# Patient Record
Sex: MALE | Race: OTHER | URBAN - METROPOLITAN AREA
[De-identification: names, ages, dates, MRNs, and addresses within clinical notes are randomized per-mention and may not be internally consistent; named-entity substitution may affect disease eponyms.]

---

## 2017-11-10 ENCOUNTER — OUTPATIENT (OUTPATIENT)
Dept: OUTPATIENT SERVICES | Facility: HOSPITAL | Age: 37
LOS: 1 days | Discharge: HOME | End: 2017-11-10

## 2017-11-10 DIAGNOSIS — Z01.818 ENCOUNTER FOR OTHER PREPROCEDURAL EXAMINATION: ICD-10-CM

## 2017-11-10 DIAGNOSIS — M67.432 GANGLION, LEFT WRIST: ICD-10-CM

## 2017-11-16 ENCOUNTER — OUTPATIENT (OUTPATIENT)
Dept: OUTPATIENT SERVICES | Facility: HOSPITAL | Age: 37
LOS: 1 days | Discharge: HOME | End: 2017-11-16

## 2017-11-20 DIAGNOSIS — M67.432 GANGLION, LEFT WRIST: ICD-10-CM

## 2018-11-05 ENCOUNTER — EMERGENCY (EMERGENCY)
Facility: HOSPITAL | Age: 38
LOS: 0 days | Discharge: LEFT BEFORE DISCHARGE | End: 2018-11-05
Attending: EMERGENCY MEDICINE | Admitting: EMERGENCY MEDICINE

## 2018-11-05 VITALS
OXYGEN SATURATION: 99 % | SYSTOLIC BLOOD PRESSURE: 134 MMHG | DIASTOLIC BLOOD PRESSURE: 80 MMHG | WEIGHT: 169.98 LBS | RESPIRATION RATE: 18 BRPM | HEART RATE: 70 BPM | TEMPERATURE: 98 F

## 2018-11-05 DIAGNOSIS — R06.02 SHORTNESS OF BREATH: ICD-10-CM
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
## 2018-11-05 DIAGNOSIS — R07.89 OTHER CHEST PAIN: ICD-10-CM
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
## 2018-11-05 DIAGNOSIS — R19.7 DIARRHEA, UNSPECIFIED: ICD-10-CM

## 2018-11-05 RX ORDER — ACETAMINOPHEN 500 MG
650 TABLET ORAL ONCE
Qty: 0 | Refills: 0 | Status: DISCONTINUED | OUTPATIENT
Start: 2018-11-05 | End: 2018-11-05
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
## 2018-11-05 NOTE — ED PROVIDER NOTE - NSFOLLOWUPINSTRUCTIONS_ED_ALL_ED_FT
Please call your primary care doctor in the AM for a follow up appointment or return to the ER for any continued concerns.        Chest Pain    Chest pain can be caused by many different conditions which may or may not be dangerous. Causes include heartburn, lung infections, heart attack, blood clot in lungs, skin infections, strain or damage to muscle, cartilage, or bones, etc. In addition to a history and physical examination, an electrocardiogram (ECG) or other lab tests may have been performed to determine the cause of your chest pain. Follow up with your primary care provider or with a cardiologist as instructed.     SEEK IMMEDIATE MEDICAL CARE IF YOU HAVE ANY OF THE FOLLOWING SYMPTOMS: worsening chest pain, coughing up blood, unexplained back/neck/jaw pain, severe abdominal pain, dizziness or lightheadedness, fainting, shortness of breath, sweaty or clammy skin, vomiting, or racing heart beat. These symptoms may represent a serious problem that is an emergency. Do not wait to see if the symptoms will go away. Get medical help right away. Call 911 and do not drive yourself to the hospital.

## 2018-11-05 NOTE — ED PROVIDER NOTE - NS ED ROS FT
Review of Systems:  · CONSTITUTIONAL: no fever and no chills.  · EYES: no discharge, no irritation, no pain, no redness, and no visual changes.  · ENMT: Ears: no ear pain and no hearing problems. Nose: no nasal congestion and no nasal drainage. Mouth/Throat: no dysphagia, no hoarseness and no throat pain.  · CARDIOVASCULAR: + chest pain, + chest wall pain  · RESPIRATORY: no cough, and + shortness of breath.  · GASTROINTESTINAL: no abdominal pain, no diarrhea, no nausea and no vomiting.  · GENITOURINARY: no dysuria  · MUSCULOSKELETAL: no back pain, no musculoskeletal pain, no weakness.  · SKIN: no rashes.  · NEURO: no loss of consciousness, no headache.  · ROS STATEMENT: all other ROS negative except as per HPI
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
## 2019-07-01 ENCOUNTER — OUTPATIENT (OUTPATIENT)
Dept: OUTPATIENT SERVICES | Facility: HOSPITAL | Age: 39
LOS: 1 days | End: 2019-07-01
Payer: MEDICAID

## 2019-07-16 ENCOUNTER — EMERGENCY (EMERGENCY)
Facility: HOSPITAL | Age: 39
LOS: 0 days | Discharge: HOME | End: 2019-07-16
Attending: EMERGENCY MEDICINE | Admitting: EMERGENCY MEDICINE
Payer: MEDICAID

## 2019-07-16 VITALS
SYSTOLIC BLOOD PRESSURE: 130 MMHG | HEART RATE: 86 BPM | OXYGEN SATURATION: 100 % | DIASTOLIC BLOOD PRESSURE: 81 MMHG | RESPIRATION RATE: 20 BRPM

## 2019-07-16 VITALS
RESPIRATION RATE: 20 BRPM | OXYGEN SATURATION: 98 % | DIASTOLIC BLOOD PRESSURE: 90 MMHG | SYSTOLIC BLOOD PRESSURE: 136 MMHG | HEART RATE: 88 BPM | TEMPERATURE: 98 F
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
## 2019-07-16 DIAGNOSIS — S32.009A UNSPECIFIED FRACTURE OF UNSPECIFIED LUMBAR VERTEBRA, INITIAL ENCOUNTER FOR CLOSED FRACTURE: ICD-10-CM
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
## 2019-07-16 DIAGNOSIS — M54.5 LOW BACK PAIN: ICD-10-CM

## 2019-07-16 LAB
ALBUMIN SERPL ELPH-MCNC: 4.5 G/DL — SIGNIFICANT CHANGE UP (ref 3.5–5.2)
ALP SERPL-CCNC: 56 U/L — SIGNIFICANT CHANGE UP (ref 30–115)
ALT FLD-CCNC: 36 U/L — SIGNIFICANT CHANGE UP (ref 0–41)
ANION GAP SERPL CALC-SCNC: 12 MMOL/L — SIGNIFICANT CHANGE UP (ref 7–14)
AST SERPL-CCNC: 18 U/L — SIGNIFICANT CHANGE UP (ref 0–41)
BASOPHILS # BLD AUTO: 0.02 K/UL — SIGNIFICANT CHANGE UP (ref 0–0.2)
BASOPHILS NFR BLD AUTO: 0.4 % — SIGNIFICANT CHANGE UP (ref 0–1)
BILIRUB SERPL-MCNC: 0.5 MG/DL — SIGNIFICANT CHANGE UP (ref 0.2–1.2)
BUN SERPL-MCNC: 16 MG/DL — SIGNIFICANT CHANGE UP (ref 10–20)
CALCIUM SERPL-MCNC: 9.6 MG/DL — SIGNIFICANT CHANGE UP (ref 8.5–10.1)
CHLORIDE SERPL-SCNC: 102 MMOL/L — SIGNIFICANT CHANGE UP (ref 98–110)
CO2 SERPL-SCNC: 25 MMOL/L — SIGNIFICANT CHANGE UP (ref 17–32)
CREAT SERPL-MCNC: 0.9 MG/DL — SIGNIFICANT CHANGE UP (ref 0.7–1.5)
EOSINOPHIL # BLD AUTO: 0.03 K/UL — SIGNIFICANT CHANGE UP (ref 0–0.7)
EOSINOPHIL NFR BLD AUTO: 0.6 % — SIGNIFICANT CHANGE UP (ref 0–8)
GLUCOSE SERPL-MCNC: 180 MG/DL — HIGH (ref 70–99)
HCT VFR BLD CALC: 40.8 % — LOW (ref 42–52)
HGB BLD-MCNC: 13.9 G/DL — LOW (ref 14–18)
IMM GRANULOCYTES NFR BLD AUTO: 0.2 % — SIGNIFICANT CHANGE UP (ref 0.1–0.3)
LIDOCAIN IGE QN: 21 U/L — SIGNIFICANT CHANGE UP (ref 7–60)
LYMPHOCYTES # BLD AUTO: 1.52 K/UL — SIGNIFICANT CHANGE UP (ref 1.2–3.4)
LYMPHOCYTES # BLD AUTO: 31.3 % — SIGNIFICANT CHANGE UP (ref 20.5–51.1)
MCHC RBC-ENTMCNC: 29.4 PG — SIGNIFICANT CHANGE UP (ref 27–31)
MCHC RBC-ENTMCNC: 34.1 G/DL — SIGNIFICANT CHANGE UP (ref 32–37)
MCV RBC AUTO: 86.4 FL — SIGNIFICANT CHANGE UP (ref 80–94)
MONOCYTES # BLD AUTO: 0.39 K/UL — SIGNIFICANT CHANGE UP (ref 0.1–0.6)
MONOCYTES NFR BLD AUTO: 8 % — SIGNIFICANT CHANGE UP (ref 1.7–9.3)
NEUTROPHILS # BLD AUTO: 2.88 K/UL — SIGNIFICANT CHANGE UP (ref 1.4–6.5)
NEUTROPHILS NFR BLD AUTO: 59.5 % — SIGNIFICANT CHANGE UP (ref 42.2–75.2)
NRBC # BLD: 0 /100 WBCS — SIGNIFICANT CHANGE UP (ref 0–0)
PLATELET # BLD AUTO: 254 K/UL — SIGNIFICANT CHANGE UP (ref 130–400)
POTASSIUM SERPL-MCNC: 4.5 MMOL/L — SIGNIFICANT CHANGE UP (ref 3.5–5)
POTASSIUM SERPL-SCNC: 4.5 MMOL/L — SIGNIFICANT CHANGE UP (ref 3.5–5)
PROT SERPL-MCNC: 7.3 G/DL — SIGNIFICANT CHANGE UP (ref 6–8)
RBC # BLD: 4.72 M/UL — SIGNIFICANT CHANGE UP (ref 4.7–6.1)
RBC # FLD: 12.4 % — SIGNIFICANT CHANGE UP (ref 11.5–14.5)
SODIUM SERPL-SCNC: 139 MMOL/L — SIGNIFICANT CHANGE UP (ref 135–146)
WBC # BLD: 4.85 K/UL — SIGNIFICANT CHANGE UP (ref 4.8–10.8)
WBC # FLD AUTO: 4.85 K/UL — SIGNIFICANT CHANGE UP (ref 4.8–10.8)

## 2019-07-16 PROCEDURE — 74177 CT ABD & PELVIS W/CONTRAST: CPT | Mod: 26

## 2019-07-16 PROCEDURE — 93010 ELECTROCARDIOGRAM REPORT: CPT

## 2019-07-16 PROCEDURE — 99285 EMERGENCY DEPT VISIT HI MDM: CPT

## 2019-07-16 PROCEDURE — 72170 X-RAY EXAM OF PELVIS: CPT | Mod: 26

## 2019-07-16 PROCEDURE — 71260 CT THORAX DX C+: CPT | Mod: 26

## 2019-07-16 PROCEDURE — 71045 X-RAY EXAM CHEST 1 VIEW: CPT | Mod: 26

## 2019-07-16 RX ORDER — DIPHENHYDRAMINE HCL 50 MG
50 CAPSULE ORAL ONCE
Refills: 0 | Status: COMPLETED | OUTPATIENT
Start: 2019-07-16 | End: 2019-07-16

## 2019-07-16 RX ORDER — MORPHINE SULFATE 50 MG/1
4 CAPSULE, EXTENDED RELEASE ORAL ONCE
Refills: 0 | Status: DISCONTINUED | OUTPATIENT
Start: 2019-07-16 | End: 2019-07-16

## 2019-07-16 RX ORDER — IPRATROPIUM/ALBUTEROL SULFATE 18-103MCG
3 AEROSOL WITH ADAPTER (GRAM) INHALATION ONCE
Refills: 0 | Status: COMPLETED | OUTPATIENT
Start: 2019-07-16 | End: 2019-07-16

## 2019-07-16 RX ORDER — DIPHENHYDRAMINE HCL 50 MG
50 CAPSULE ORAL EVERY 6 HOURS
Refills: 0 | Status: DISCONTINUED | OUTPATIENT
Start: 2019-07-16 | End: 2019-07-16

## 2019-07-16 RX ORDER — SODIUM CHLORIDE 9 MG/ML
1000 INJECTION, SOLUTION INTRAVENOUS ONCE
Refills: 0 | Status: COMPLETED | OUTPATIENT
Start: 2019-07-16 | End: 2019-07-16

## 2019-07-16 RX ADMIN — Medication 50 MILLIGRAM(S): at 10:47

## 2019-07-16 RX ADMIN — MORPHINE SULFATE 4 MILLIGRAM(S): 50 CAPSULE, EXTENDED RELEASE ORAL at 10:12

## 2019-07-16 RX ADMIN — SODIUM CHLORIDE 2000 MILLILITER(S): 9 INJECTION, SOLUTION INTRAVENOUS at 10:14

## 2019-07-16 RX ADMIN — Medication 125 MILLIGRAM(S): at 10:47

## 2019-07-16 RX ADMIN — Medication 3 MILLILITER(S): at 11:58

## 2019-07-16 NOTE — ED PROVIDER NOTE - NSFOLLOWUPINSTRUCTIONS_ED_ALL_ED_FT
Transverse process Fracture    A fracture is a break in one of your bones. This can occur from a variety of injuries, especially traumatic ones. Symptoms include pain, bruising, or swelling. Do not use the injured limb. If a fracture is in one of the bones below your waist, do not put weight on that limb unless instructed to do so by your healthcare provider. Crutches or a cane may have been provided. A splint or cast may have been applied by your health care provider. Make sure to keep it dry and follow up with an orthopedist as instructed.    SEEK IMMEDIATE MEDICAL CARE IF YOU HAVE ANY OF THE FOLLOWING SYMPTOMS: numbness, tingling, increasing pain, or weakness in any part of the injured limb.

## 2019-07-16 NOTE — ED PROVIDER NOTE - NS ED ROS FT
Review of Systems:  •	CONSTITUTIONAL - No fever, No diaphoresis, No weight change  •	SKIN - No rash  •	HEMATOLOGIC - No abnormal bleeding or bruising  •	EYES - No eye pain, No blurred vision  •	ENT - No change in hearing, No sore throat, No neck pain, No rhinorrhea, No ear pain  •	RESPIRATORY - No shortness of breath, No cough  •	CARDIAC -No chest pain, No palpitations  •	GI - No abdominal pain, No nausea, No vomiting, No diarrhea, No constipation, No bright red blood per rectum or melena. No flank pain  •                 - No dysuria, frequency, hematuria.   •	ENDO - No polydypsia, No polyuria, No heat/cold intolerance  •	MUSCULOSKELETAL - No joint paint, No swelling, + back pain  •	NEUROLOGIC - No numbness, No focal weakness, No headache, No dizziness  All other systems negative, unless specified in HPI

## 2019-07-16 NOTE — ED PROVIDER NOTE - PHYSICAL EXAMINATION
CONSTITUTIONAL: Well-developed; well-nourished; in no acute distress.   SKIN: warm, dry  HEAD: Normocephalic; atraumatic.  EYES: no conjunctival injection. PERRL.   ENT: No nasal discharge; airway clear.  NECK: Supple; non tender.  CARD: S1, S2 normal; no murmurs, gallops, or rubs. Regular rate and rhythm.   RESP: No wheezes, rales or rhonchi.  ABD: soft ntnd  BACK: Mild ttp over the midline thoracolumbar area.  EXT: Normal ROM.  No clubbing, cyanosis or edema.   LYMPH: No acute cervical adenopathy.  NEURO: AOx4, CN 2-12 grossly intact. +5/5 strength and sensation wnl in all extremities. No pronator drift, no dysarthria, no ataxia, normal gait, no DM/DDK, negative romberg.  PSYCH: Cooperative, appropriate.
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
## 2019-07-16 NOTE — ED ADULT NURSE REASSESSMENT NOTE - NS ED NURSE REASSESS COMMENT FT1
pt returned from CT scan, pt family at bedside, pt states he feels itchy on face "I want to sratch my face off" pt was found to have forehead red with a 1 hive to left forehead,  MD Khanna and MD Campos immedatiely notified and assessed pt.

## 2019-07-16 NOTE — ED PROVIDER NOTE - CARE PROVIDER_API CALL
Chantell Garcia)  Neurological Surgery  [address], [address]  [place] [address]  Phone: [phone]  Fax: [phone]  Follow Up Time:

## 2019-07-16 NOTE — ED ADULT NURSE NOTE - NSIMPLEMENTINTERV_GEN_ALL_ED
Implemented All Fall with Harm Risk Interventions:  Dorris to call system. Call bell, personal items and telephone within reach. Instruct patient to call for assistance. Room bathroom lighting operational. Non-slip footwear when patient is off stretcher. Physically safe environment: no spills, clutter or unnecessary equipment. Stretcher in lowest position, wheels locked, appropriate side rails in place. Provide visual cue, wrist band, yellow gown, etc. Monitor gait and stability. Monitor for mental status changes and reorient to person, place, and time. Review medications for side effects contributing to fall risk. Reinforce activity limits and safety measures with patient and family. Provide visual clues: red socks.

## 2019-07-18 PROBLEM — Z00.00 ENCOUNTER FOR PREVENTIVE HEALTH EXAMINATION: Status: ACTIVE | Noted: 2019-07-18

## 2019-07-23 DIAGNOSIS — Z71.89 OTHER SPECIFIED COUNSELING: ICD-10-CM

## 2019-07-24 ENCOUNTER — FORM ENCOUNTER (OUTPATIENT)
Age: 39
End: 2019-07-24

## 2019-07-25 ENCOUNTER — APPOINTMENT (OUTPATIENT)
Dept: PEDIATRIC ORTHOPEDIC SURGERY | Facility: CLINIC | Age: 39
End: 2019-07-25
Payer: MEDICAID

## 2019-07-25 ENCOUNTER — TRANSCRIPTION ENCOUNTER (OUTPATIENT)
Age: 39
End: 2019-07-25

## 2019-07-25 ENCOUNTER — OUTPATIENT (OUTPATIENT)
Dept: OUTPATIENT SERVICES | Facility: HOSPITAL | Age: 39
LOS: 1 days | Discharge: HOME | End: 2019-07-25
Payer: MEDICAID

## 2019-07-25 DIAGNOSIS — S32.009A UNSPECIFIED FRACTURE OF UNSPECIFIED LUMBAR VERTEBRA, INITIAL ENCOUNTER FOR CLOSED FRACTURE: ICD-10-CM

## 2019-07-25 DIAGNOSIS — S82.192A OTHER FRACTURE OF UPPER END OF LEFT TIBIA, INITIAL ENCOUNTER FOR CLOSED FRACTURE: ICD-10-CM

## 2019-07-25 DIAGNOSIS — M54.5 LOW BACK PAIN: ICD-10-CM

## 2019-07-25 DIAGNOSIS — Z78.9 OTHER SPECIFIED HEALTH STATUS: ICD-10-CM
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
## 2019-07-25 DIAGNOSIS — S83.511A SPRAIN OF ANTERIOR CRUCIATE LIGAMENT OF RIGHT KNEE, INITIAL ENCOUNTER: ICD-10-CM

## 2019-07-25 PROCEDURE — 99204 OFFICE O/P NEW MOD 45 MIN: CPT

## 2019-07-25 PROCEDURE — 73562 X-RAY EXAM OF KNEE 3: CPT | Mod: 26,LT

## 2019-07-25 RX ORDER — GABAPENTIN 300 MG/1
300 CAPSULE ORAL 3 TIMES DAILY
Qty: 28 | Refills: 0 | Status: ACTIVE | COMMUNITY
Start: 2019-07-25 | End: 1900-01-01

## 2019-07-25 RX ORDER — AZITHROMYCIN 250 MG/1
250 TABLET, FILM COATED ORAL
Qty: 6 | Refills: 0 | Status: DISCONTINUED | COMMUNITY
Start: 2019-04-16
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
## 2019-07-25 NOTE — ASSESSMENT
[FreeTextEntry1] : Will repeat knee xrays\par Will get corset and then LSO for the spine\par Neurontin and Advil for pain control\par \par PT OT for left knee ROM after 4 weeks\par \par MRI left knee in 3 weeks

## 2019-07-25 NOTE — PHYSICAL EXAM
[UE/LE] : Sensory: Intact in bilateral upper & lower extremities [ALL] : dorsalis pedis, posterior tibial, femoral, popliteal, and radial 2+ and symmetric bilaterally [Normal RUE] : Right Upper Extremity: No scars, rashes, lesions, ulcers, skin intact [Normal LUE] : Left Upper Extremity: No scars, rashes, lesions, ulcers, skin intact [Normal RLE] : Right Lower Extremity: No scars, rashes, lesions, ulcers, skin intact [Normal LLE] : Left Lower Extremity: No scars, rashes, lesions, ulcers, skin intact [Normal] : Normal bowel sounds, soft, non-tender, no hepato-splenomegaly and no abdominal mass palpated [Poor Appearance] : well-appearing [Acute Distress] : not in acute distress [de-identified] : TTP at right aspect of his spine\par \par LEft knee pain w anterior drawer and lachman\par Good ROM \par  [de-identified] : Right L3/L4 TP fx\par ? lucency in tibial eminence\par

## 2019-08-05 DIAGNOSIS — Z76.89 PERSONS ENCOUNTERING HEALTH SERVICES IN OTHER SPECIFIED CIRCUMSTANCES: ICD-10-CM

## 2020-01-23 NOTE — ED PROVIDER NOTE - NSFOLLOWUPCLINICS_GEN_ALL_ED_FT
A Family Medicine Doctor  Family Medicine  .  [place]   Phone:   Fax:   Follow Up Time: Electrodesiccation And Curettage Text: The wound bed was treated with electrodesiccation and curettage after the biopsy was performed. Render Post-Care Instructions In Note?: yes Dressing: bandage Consent: Written consent was obtained and risks were reviewed including but not limited to scarring, infection, bleeding, scabbing, incomplete removal, nerve damage and allergy to anesthesia. Hide Additional Anticipated Plan?: No Type Of Destruction Used: Curettage Anesthesia Volume In Cc: 0.5 Detail Level: Detailed Billing Type: Third-Party Bill Silver Nitrate Text: The wound bed was treated with silver nitrate after the biopsy was performed. Hemostasis: Electrocautery and Aluminum Chloride Biopsy Method: Dermablade Post-Care Instructions: I reviewed with the patient in detail post-care instructions. Patient is to keep the biopsy site dry overnight, and then apply petrolatum daily or as needed until healed. Size Of Lesion In Cm: 1 Cryotherapy Text: The wound bed was treated with cryotherapy after the biopsy was performed. Additional Anesthesia Volume In Cc (Will Not Render If 0): 0 Anesthesia Type: 1% lidocaine with epinephrine Curettage Text: The wound bed was treated with curettage after the biopsy was performed. Lab: [id number] Depth Of Biopsy: dermis Biopsy Type: H and E Electrodesiccation Text: The wound bed was treated with electrodesiccation after the biopsy was performed. Lab Facility:  Wound Care: Petrolatum Notification Instructions: Patient will be notified of biopsy results. However, patient instructed to call the office if not contacted within 2 weeks.

## 2020-01-28 ENCOUNTER — OUTPATIENT (OUTPATIENT)
Dept: OUTPATIENT SERVICES | Facility: HOSPITAL | Age: 40
LOS: 1 days | Discharge: HOME | End: 2020-01-28
Payer: MEDICAID

## 2020-01-28 VITALS
TEMPERATURE: 98 F | SYSTOLIC BLOOD PRESSURE: 135 MMHG | WEIGHT: 175.93 LBS | DIASTOLIC BLOOD PRESSURE: 84 MMHG | HEIGHT: 64 IN | HEART RATE: 98 BPM | OXYGEN SATURATION: 100 % | RESPIRATION RATE: 22 BRPM

## 2020-01-28 DIAGNOSIS — Z87.81 PERSONAL HISTORY OF (HEALED) TRAUMATIC FRACTURE: Chronic | ICD-10-CM

## 2020-01-28 DIAGNOSIS — Z98.890 OTHER SPECIFIED POSTPROCEDURAL STATES: Chronic | ICD-10-CM

## 2020-01-28 DIAGNOSIS — Z01.818 ENCOUNTER FOR OTHER PREPROCEDURAL EXAMINATION: ICD-10-CM

## 2020-01-28 DIAGNOSIS — S43.432A SUPERIOR GLENOID LABRUM LESION OF LEFT SHOULDER, INITIAL ENCOUNTER: ICD-10-CM

## 2020-01-28 LAB
ALBUMIN SERPL ELPH-MCNC: 4.7 G/DL — SIGNIFICANT CHANGE UP (ref 3.5–5.2)
ALP SERPL-CCNC: 64 U/L — SIGNIFICANT CHANGE UP (ref 30–115)
ALT FLD-CCNC: 41 U/L — SIGNIFICANT CHANGE UP (ref 0–41)
ANION GAP SERPL CALC-SCNC: 16 MMOL/L — HIGH (ref 7–14)
APTT BLD: 30.4 SEC — SIGNIFICANT CHANGE UP (ref 27–39.2)
AST SERPL-CCNC: 23 U/L — SIGNIFICANT CHANGE UP (ref 0–41)
BASOPHILS # BLD AUTO: 0.03 K/UL — SIGNIFICANT CHANGE UP (ref 0–0.2)
BASOPHILS NFR BLD AUTO: 0.5 % — SIGNIFICANT CHANGE UP (ref 0–1)
BILIRUB SERPL-MCNC: 0.4 MG/DL — SIGNIFICANT CHANGE UP (ref 0.2–1.2)
BUN SERPL-MCNC: 13 MG/DL — SIGNIFICANT CHANGE UP (ref 10–20)
CALCIUM SERPL-MCNC: 9.4 MG/DL — SIGNIFICANT CHANGE UP (ref 8.5–10.1)
CHLORIDE SERPL-SCNC: 97 MMOL/L — LOW (ref 98–110)
CO2 SERPL-SCNC: 23 MMOL/L — SIGNIFICANT CHANGE UP (ref 17–32)
CREAT SERPL-MCNC: 0.8 MG/DL — SIGNIFICANT CHANGE UP (ref 0.7–1.5)
EOSINOPHIL # BLD AUTO: 0.05 K/UL — SIGNIFICANT CHANGE UP (ref 0–0.7)
EOSINOPHIL NFR BLD AUTO: 0.8 % — SIGNIFICANT CHANGE UP (ref 0–8)
GLUCOSE SERPL-MCNC: 237 MG/DL — HIGH (ref 70–99)
HCT VFR BLD CALC: 43.2 % — SIGNIFICANT CHANGE UP (ref 42–52)
HGB BLD-MCNC: 15.2 G/DL — SIGNIFICANT CHANGE UP (ref 14–18)
IMM GRANULOCYTES NFR BLD AUTO: 0.2 % — SIGNIFICANT CHANGE UP (ref 0.1–0.3)
INR BLD: 0.87 RATIO — SIGNIFICANT CHANGE UP (ref 0.65–1.3)
LYMPHOCYTES # BLD AUTO: 2.17 K/UL — SIGNIFICANT CHANGE UP (ref 1.2–3.4)
LYMPHOCYTES # BLD AUTO: 34.4 % — SIGNIFICANT CHANGE UP (ref 20.5–51.1)
MCHC RBC-ENTMCNC: 30.6 PG — SIGNIFICANT CHANGE UP (ref 27–31)
MCHC RBC-ENTMCNC: 35.2 G/DL — SIGNIFICANT CHANGE UP (ref 32–37)
MCV RBC AUTO: 87.1 FL — SIGNIFICANT CHANGE UP (ref 80–94)
MONOCYTES # BLD AUTO: 0.51 K/UL — SIGNIFICANT CHANGE UP (ref 0.1–0.6)
MONOCYTES NFR BLD AUTO: 8.1 % — SIGNIFICANT CHANGE UP (ref 1.7–9.3)
NEUTROPHILS # BLD AUTO: 3.54 K/UL — SIGNIFICANT CHANGE UP (ref 1.4–6.5)
NEUTROPHILS NFR BLD AUTO: 56 % — SIGNIFICANT CHANGE UP (ref 42.2–75.2)
NRBC # BLD: 0 /100 WBCS — SIGNIFICANT CHANGE UP (ref 0–0)
PLATELET # BLD AUTO: 258 K/UL — SIGNIFICANT CHANGE UP (ref 130–400)
POTASSIUM SERPL-MCNC: 4.4 MMOL/L — SIGNIFICANT CHANGE UP (ref 3.5–5)
POTASSIUM SERPL-SCNC: 4.4 MMOL/L — SIGNIFICANT CHANGE UP (ref 3.5–5)
PROT SERPL-MCNC: 7.4 G/DL — SIGNIFICANT CHANGE UP (ref 6–8)
PROTHROM AB SERPL-ACNC: 10 SEC — SIGNIFICANT CHANGE UP (ref 9.95–12.87)
RBC # BLD: 4.96 M/UL — SIGNIFICANT CHANGE UP (ref 4.7–6.1)
RBC # FLD: 12.1 % — SIGNIFICANT CHANGE UP (ref 11.5–14.5)
SODIUM SERPL-SCNC: 136 MMOL/L — SIGNIFICANT CHANGE UP (ref 135–146)
WBC # BLD: 6.31 K/UL — SIGNIFICANT CHANGE UP (ref 4.8–10.8)
WBC # FLD AUTO: 6.31 K/UL — SIGNIFICANT CHANGE UP (ref 4.8–10.8)

## 2020-01-28 PROCEDURE — 93010 ELECTROCARDIOGRAM REPORT: CPT

## 2020-01-28 NOTE — H&P PST ADULT - HISTORY OF PRESENT ILLNESS
s40 y/o male scheduled for right shoulder arthroscopy labral repair decompression debridement  reports no c/o cp,sob,palpitations,cough or dysuria  1-2 fos without sob

## 2020-01-29 LAB
ESTIMATED AVERAGE GLUCOSE: 200 MG/DL — HIGH (ref 68–114)
HBA1C BLD-MCNC: 8.6 % — HIGH (ref 4–5.6)

## 2020-02-04 ENCOUNTER — OUTPATIENT (OUTPATIENT)
Dept: OUTPATIENT SERVICES | Facility: HOSPITAL | Age: 40
LOS: 1 days | Discharge: HOME | End: 2020-02-04
Payer: MEDICAID

## 2020-02-04 ENCOUNTER — RESULT REVIEW (OUTPATIENT)
Age: 40
End: 2020-02-04

## 2020-02-04 VITALS
OXYGEN SATURATION: 97 % | RESPIRATION RATE: 20 BRPM | HEART RATE: 90 BPM | SYSTOLIC BLOOD PRESSURE: 131 MMHG | DIASTOLIC BLOOD PRESSURE: 89 MMHG

## 2020-02-04 VITALS
RESPIRATION RATE: 18 BRPM | WEIGHT: 175.93 LBS | HEIGHT: 64 IN | DIASTOLIC BLOOD PRESSURE: 76 MMHG | HEART RATE: 75 BPM | SYSTOLIC BLOOD PRESSURE: 117 MMHG | TEMPERATURE: 98 F | OXYGEN SATURATION: 98 %

## 2020-02-04 DIAGNOSIS — Z87.81 PERSONAL HISTORY OF (HEALED) TRAUMATIC FRACTURE: Chronic | ICD-10-CM

## 2020-02-04 DIAGNOSIS — Z98.890 OTHER SPECIFIED POSTPROCEDURAL STATES: Chronic | ICD-10-CM

## 2020-02-04 DIAGNOSIS — S43.439A SUPERIOR GLENOID LABRUM LESION OF UNSPECIFIED SHOULDER, INITIAL ENCOUNTER: ICD-10-CM

## 2020-02-04 LAB — GLUCOSE BLDC GLUCOMTR-MCNC: 163 MG/DL — HIGH (ref 70–99)

## 2020-02-04 PROCEDURE — 88304 TISSUE EXAM BY PATHOLOGIST: CPT | Mod: 26

## 2020-02-04 RX ORDER — OXYCODONE AND ACETAMINOPHEN 5; 325 MG/1; MG/1
1 TABLET ORAL EVERY 4 HOURS
Refills: 0 | Status: DISCONTINUED | OUTPATIENT
Start: 2020-02-04 | End: 2020-02-04

## 2020-02-04 RX ORDER — HYDROMORPHONE HYDROCHLORIDE 2 MG/ML
1 INJECTION INTRAMUSCULAR; INTRAVENOUS; SUBCUTANEOUS
Refills: 0 | Status: DISCONTINUED | OUTPATIENT
Start: 2020-02-04 | End: 2020-02-04

## 2020-02-04 RX ORDER — ONDANSETRON 8 MG/1
4 TABLET, FILM COATED ORAL ONCE
Refills: 0 | Status: DISCONTINUED | OUTPATIENT
Start: 2020-02-04 | End: 2020-02-22

## 2020-02-04 RX ORDER — SODIUM CHLORIDE 9 MG/ML
1000 INJECTION, SOLUTION INTRAVENOUS
Refills: 0 | Status: DISCONTINUED | OUTPATIENT
Start: 2020-02-04 | End: 2020-02-22

## 2020-02-04 RX ORDER — HYDROMORPHONE HYDROCHLORIDE 2 MG/ML
0.5 INJECTION INTRAMUSCULAR; INTRAVENOUS; SUBCUTANEOUS
Refills: 0 | Status: DISCONTINUED | OUTPATIENT
Start: 2020-02-04 | End: 2020-02-04

## 2020-02-04 RX ADMIN — HYDROMORPHONE HYDROCHLORIDE 1 MILLIGRAM(S): 2 INJECTION INTRAMUSCULAR; INTRAVENOUS; SUBCUTANEOUS at 09:16

## 2020-02-04 RX ADMIN — HYDROMORPHONE HYDROCHLORIDE 0.5 MILLIGRAM(S): 2 INJECTION INTRAMUSCULAR; INTRAVENOUS; SUBCUTANEOUS at 09:32

## 2020-02-04 RX ADMIN — HYDROMORPHONE HYDROCHLORIDE 0.5 MILLIGRAM(S): 2 INJECTION INTRAMUSCULAR; INTRAVENOUS; SUBCUTANEOUS at 09:19

## 2020-02-04 RX ADMIN — HYDROMORPHONE HYDROCHLORIDE 0.5 MILLIGRAM(S): 2 INJECTION INTRAMUSCULAR; INTRAVENOUS; SUBCUTANEOUS at 09:49

## 2020-02-04 RX ADMIN — HYDROMORPHONE HYDROCHLORIDE 0.5 MILLIGRAM(S): 2 INJECTION INTRAMUSCULAR; INTRAVENOUS; SUBCUTANEOUS at 09:45

## 2020-02-04 RX ADMIN — HYDROMORPHONE HYDROCHLORIDE 0.5 MILLIGRAM(S): 2 INJECTION INTRAMUSCULAR; INTRAVENOUS; SUBCUTANEOUS at 09:35

## 2020-02-04 RX ADMIN — HYDROMORPHONE HYDROCHLORIDE 1 MILLIGRAM(S): 2 INJECTION INTRAMUSCULAR; INTRAVENOUS; SUBCUTANEOUS at 09:09

## 2020-02-04 NOTE — ASU PREOP CHECKLIST - ORDERS/MEDICATION ADMINISTRATION RECORD ON CHART
6/26/2018         RE: Dionte Sheffield  [address] [address]        Dear Colleague,    Thank you for referring your patient, Dionte Sheffield, to the [facility]. Please see a copy of my visit note below.    HISTORY OF PRESENT ILLNESS  Mr. Sheffield is a pleasant 67 year old year old male who presents to clinic today with right hand and wrist pain  Dionte explains that he struck his club against the ground golfing a few weeks ago, and now has pain in his hand that comes and goes with activity. It hurts his hand an middle 3 fingers at times and radiates  Location: right hand  Quality:  achy pain    Severity: 5/10 at worst    Duration: 1 month  Timing: occurs intermittently  Context: occurs while exercising and lifting  Modifying factors:  resting and non-use makes it better, movement and use makes it worse  Associated signs & symptoms: some tingling at times in fingers    Additional history: as documented    MEDICAL HISTORY  Patient Active Problem List   Diagnosis     HYPERLIPIDEMIA LDL GOAL <130     Hypertension goal BP (blood pressure) < 140/90     Escobar's esophagus     Fatty liver     Biliary sludge     HDL lipoprotein deficiency     High triglycerides     Obesity     Advanced directives, counseling/discussion     BPH (benign prostatic hyperplasia)     Pain in scrotum or testicle     OA (osteoarthritis) of right knee     OA (osteoarthritis) of hip     S/P total hip arthroplasty done 8/3/15     JEMAL (obstructive sleep apnea)     Tobacco use disorder     Gastroesophageal reflux disease without esophagitis     IGT (impaired glucose tolerance)     10 year risk of MI or stroke 7.5% or greater, 16.7% in Dec 2017 on atorvastatin 40       Current Outpatient Prescriptions   Medication Sig Dispense Refill     aspirin 81 MG tablet Take 1 tablet (81 mg) by mouth daily       atorvastatin (LIPITOR) 40 MG tablet TAKE 1 TABLET(40 MG) BY MOUTH AT BEDTIME 90 tablet 2     doxazosin  (CARDURA) 8 MG tablet TAKE 1 TABLET(8 MG) BY MOUTH AT BEDTIME 90 tablet 2     fenofibrate 160 MG tablet TAKE 1 TABLET(160 MG) BY MOUTH DAILY 90 tablet 1     metoprolol succinate (TOPROL-XL) 50 MG 24 hr tablet TAKE 1 TABLET(50 MG) BY MOUTH DAILY 90 tablet 2     omega-3 fatty acids (FISH OIL) 1200 MG capsule Take 2 capsules by mouth 2 times daily.       omeprazole 20 MG tablet Take 2 tablets by mouth daily.       order for DME Equipment ordered: RESMED Auto PAP Mask type: Full face  Settings: 9-15 cm       methylPREDNISolone (MEDROL DOSEPAK) 4 MG tablet Follow package instructions (Patient not taking: Reported on 6/26/2018) 21 tablet 0       Allergies   Allergen Reactions     Crestor [Rosuvastatin Calcium]      Myalgias         Family History   Problem Relation Age of Onset     Hypertension Father      Cerebrovascular Disease Father      Cerebrovascular Disease Paternal Grandmother      Cerebrovascular Disease Paternal Grandfather      Diabetes Brother      at age 60     C.A.D. No family hx of      Cancer - colorectal No family hx of      Prostate Cancer No family hx of      Anesthesia Reaction No family hx of      Blood Disease No family hx of        Additional medical/Social/Surgical histories reviewed in [employer] and updated as appropriate.     REVIEW OF SYSTEMS (6/26/2018)  10 point ROS of systems including Constitutional, Eyes, Respiratory, Cardiovascular, Gastroenterology, Genitourinary, Integumentary, Musculoskeletal, Psychiatric were all negative except for pertinent positives noted in my HPI.     PHYSICAL EXAM  Vitals:    06/26/18 0752   BP: 132/85   Pulse: 111   SpO2: 99%   Weight: 120.2 kg (265 lb)   Height: 1.829 m (6')     Vital Signs: /85  Pulse 111  Ht 1.829 m (6')  Wt 120.2 kg (265 lb)  SpO2 99%  BMI 35.94 kg/m2 Patient declined being weighed. Body mass index is 35.94 kg/(m^2).    General  - normal appearance, in no obvious distress  CV  - normal radial pulse  Pulm  - normal respiratory pattern,  non-labored  Musculoskeletal - right wrist  - inspection: normal joint alignment, no obvious deformity, no swelling  - palpation: no bony or soft tissue tenderness, no tenderness at the anatomical snuffbox  - ROM:  90 deg flexion   70 deg extension   25 deg abduction   65 deg adduction  - strength: 5/5  strength, 5/5 flexion, extension, pronation, supination, adduction, abduction  - special tests:  (-) Tinel's  (-) Finkelstein  (-) Phalen  (-) Suggs click test  (-) ulnar impaction  Neuro  - no sensory or motor deficit, grossly normal coordination, normal muscle tone  Skin  - no ecchymosis, erythema, warmth, or induration, no obvious rash  Psych  - interactive, appropriate, normal mood and affect  ASSESSMENT & PLAN  66 yo male with right wrist carpal tunnel symptoms  voltaren bid   Wrist brace nighttime and PRN daily  F/u in a few weeks and consider CT injection for diagnostic and therapeutic purpose      Dionte Ring MD, [facility]      Again, thank you for allowing me to participate in the care of your patient.        Sincerely,        Dionte Ring MD     done

## 2020-02-04 NOTE — PRE-ANESTHESIA EVALUATION ADULT - RESPIRATORY RATE (BREATHS/MIN)
----- Message from Mary Abreu RN sent at 1/10/2020 10:28 AM CST -----  Regarding: FW: FW: FW: Medication Question  Contact: [phone]    ----- Message -----  From: Syd Hernandez  Sent: 1/10/2020  10:17 AM CST  To: , #  Subject: RE: FW: FW: Medication Question                  This message is being sent by Darek Hernandez on behalf of Syd Hernandez.    It's just so weird that this only happens at night with the hives. He was doing albuterol but stopped bc we thought maybe a reaction to that? He has only been doing benedryl at night especially since that's when the hives show up.    We have not been doing claritan or Zyrtec. He has no runny or stuffy nose.     No shortness of breath that I have seen or that he has told me about. Nothing coming up when he coughs but still sounds wet sometimes and dry others. It's doesn't seem to be better though especially nighttime like from 5-8 he coughs what seems like a lot but what do I know?! :)    Thoughts? Sorry to be a bother just worried and want to help.     ----- Message -----  From: Mila Noriega MD  Sent: 1/10/20 9:05 AM  To: Syd Hernandez  Subject: RE: FW: FW: Medication Question    This looks like a hive even though not all over the body it can be a hive.  Sometimes it is just in one local area and worsens with itching  Do you have him on zyrtec or claritin?  If it is claritin change to zyrtec 10 mg each day.    Is his cough productive at this time, does he feel shortness of breath?  Is his nose stuffy or runny at all now?        ----- Message -----  From: Syd Hernandez  Sent: 1/9/2020   7:55 PM CST  To: , #  Subject: RE: FW: Medication Question                      This message is being sent by Darek Hernandez on behalf of Syd Hernandez.      Good evening. So the last two nights this has been happening on Syd's left arm. These welt looking things and they are itchy. They only come out at night and go away after taking benedryl. Spoke to a nurse about this last  night.    Any thoughts? Could these be just from the virus? Odd that it only happens at night and only on his left upper arm.     His cough has not gotten any better...if anything he's coughing more often.     Any advice would be great.  Thanks.  Uzma  ----- Message -----  From: Mila Noriega MD  Sent: 1/8/20 12:08 PM  To: Syd Hernandez  Subject: RE: FW: Medication Question    Hello!    I did not see a message from yesterday so not sure what happened either!    I would do both until cough is gone ( hopefully this would be no more than 10 days or so).    I know he is coming back on the 15th and good time to check in then unless he is worse.    Mila Noriega      ----- Message -----  From: Syd Hernandez  Sent: 1/8/2020   8:59 AM CST  To: , #  Subject: Medication Question                              This message is being sent by Darek Hernandez on behalf of Syd Hernandez.    Good morning. I'm not sure you responded to my email from yesterday. It said I had a message but then it was gone.   I emailed about how many days to do benedryl and how many days to do inhaler.     I think we are good w the inhaler and don't need the spacer.     Could you reply again, please? Not sure what happened!   Thanks!         18

## 2020-02-04 NOTE — ASU DISCHARGE PLAN (ADULT/PEDIATRIC) - ASU DC SPECIAL INSTRUCTIONSFT
Post -Operative Shoulder Arthroscopy Instructions    Anesthesia:  - No alcoholic beverages, including beer and wine, for 24 hours or while on presecribed pain medications  - Do not make any important decisions or sign any legal documents  - Do not drive or operate machinary for 24 hours  - You are required upon discharge to leave the surgical center with a responsible adult who will drive you home    Surgery:  - Keep sling on until being seen in office.  You can move fingers, wrist and elbow while in sling  - Keep clean and dry for 3 days  - Remove dressing in 3 days and cover wounds with band-aids, you can then shower  - Take pain medication as prescribed  - Resume regular diet  - Follow-up in approximately 1 week    FOR QUESTIONS OR CONCERNS, CALL [phone]    Notify your doctor if you develop: fever, chills, excessive swelling, drainage, pain not controlled by pain medication, persistent numbness in hand or fingers     IF AN EMERGENCY ARISES , CALL 911 AND/OR GO TO THE EMERGENCY ROOM    Dr. Burleson  [phone]

## 2020-02-05 LAB — SURGICAL PATHOLOGY STUDY: SIGNIFICANT CHANGE UP

## 2020-02-07 DIAGNOSIS — M65.811 OTHER SYNOVITIS AND TENOSYNOVITIS, RIGHT SHOULDER: ICD-10-CM

## 2020-02-07 DIAGNOSIS — Z79.84 LONG TERM (CURRENT) USE OF ORAL HYPOGLYCEMIC DRUGS: ICD-10-CM
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
## 2020-02-07 DIAGNOSIS — S43.431A SUPERIOR GLENOID LABRUM LESION OF RIGHT SHOULDER, INITIAL ENCOUNTER: ICD-10-CM

## 2020-02-07 DIAGNOSIS — E11.9 TYPE 2 DIABETES MELLITUS WITHOUT COMPLICATIONS: ICD-10-CM
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
## 2020-06-07 ENCOUNTER — EMERGENCY (EMERGENCY)
Facility: HOSPITAL | Age: 40
LOS: 0 days | Discharge: HOME | End: 2020-06-07
Attending: EMERGENCY MEDICINE | Admitting: EMERGENCY MEDICINE
Payer: MEDICAID

## 2020-06-07 VITALS
HEART RATE: 90 BPM | SYSTOLIC BLOOD PRESSURE: 134 MMHG | TEMPERATURE: 98 F | DIASTOLIC BLOOD PRESSURE: 92 MMHG | OXYGEN SATURATION: 97 % | RESPIRATION RATE: 18 BRPM

## 2020-06-07 DIAGNOSIS — Z79.1 LONG TERM (CURRENT) USE OF NON-STEROIDAL ANTI-INFLAMMATORIES (NSAID): ICD-10-CM

## 2020-06-07 DIAGNOSIS — S50.812A ABRASION OF LEFT FOREARM, INITIAL ENCOUNTER: ICD-10-CM

## 2020-06-07 DIAGNOSIS — M54.2 CERVICALGIA: ICD-10-CM
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
## 2020-06-07 DIAGNOSIS — Z79.84 LONG TERM (CURRENT) USE OF ORAL HYPOGLYCEMIC DRUGS: ICD-10-CM
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
## 2020-06-07 DIAGNOSIS — Z98.890 OTHER SPECIFIED POSTPROCEDURAL STATES: Chronic | ICD-10-CM

## 2020-06-07 DIAGNOSIS — E11.9 TYPE 2 DIABETES MELLITUS WITHOUT COMPLICATIONS: ICD-10-CM

## 2020-06-07 DIAGNOSIS — Z87.81 PERSONAL HISTORY OF (HEALED) TRAUMATIC FRACTURE: Chronic | ICD-10-CM

## 2020-06-07 DIAGNOSIS — M25.512 PAIN IN LEFT SHOULDER: ICD-10-CM

## 2020-06-07 DIAGNOSIS — M79.652 PAIN IN LEFT THIGH: ICD-10-CM
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
## 2020-06-07 PROCEDURE — 73590 X-RAY EXAM OF LOWER LEG: CPT | Mod: 26,LT

## 2020-06-07 PROCEDURE — 73090 X-RAY EXAM OF FOREARM: CPT | Mod: 26,LT

## 2020-06-07 PROCEDURE — 70450 CT HEAD/BRAIN W/O DYE: CPT | Mod: 26

## 2020-06-07 PROCEDURE — 71045 X-RAY EXAM CHEST 1 VIEW: CPT | Mod: 26

## 2020-06-07 PROCEDURE — 73030 X-RAY EXAM OF SHOULDER: CPT | Mod: 26,LT

## 2020-06-07 PROCEDURE — 73552 X-RAY EXAM OF FEMUR 2/>: CPT | Mod: 26,LT

## 2020-06-07 PROCEDURE — 99285 EMERGENCY DEPT VISIT HI MDM: CPT

## 2020-06-07 PROCEDURE — 72125 CT NECK SPINE W/O DYE: CPT | Mod: 26

## 2020-06-07 PROCEDURE — 72170 X-RAY EXAM OF PELVIS: CPT | Mod: 26

## 2020-06-07 PROCEDURE — 73130 X-RAY EXAM OF HAND: CPT | Mod: 26,LT

## 2020-06-07 PROCEDURE — 73562 X-RAY EXAM OF KNEE 3: CPT | Mod: 26,LT

## 2020-06-07 RX ORDER — ACETAMINOPHEN 500 MG
975 TABLET ORAL ONCE
Refills: 0 | Status: COMPLETED | OUTPATIENT
Start: 2020-06-07 | End: 2020-06-07

## 2020-06-07 RX ORDER — MORPHINE SULFATE 50 MG/1
4 CAPSULE, EXTENDED RELEASE ORAL ONCE
Refills: 0 | Status: DISCONTINUED | OUTPATIENT
Start: 2020-06-07 | End: 2020-06-07

## 2020-06-07 RX ADMIN — Medication 975 MILLIGRAM(S): at 15:30

## 2020-06-07 RX ADMIN — MORPHINE SULFATE 4 MILLIGRAM(S): 50 CAPSULE, EXTENDED RELEASE ORAL at 16:03

## 2020-06-07 RX ADMIN — MORPHINE SULFATE 4 MILLIGRAM(S): 50 CAPSULE, EXTENDED RELEASE ORAL at 18:04
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
## 2020-06-07 NOTE — ED PROVIDER NOTE - PATIENT PORTAL LINK FT
You can access the FollowMyHealth Patient Portal offered by [facility] by registering at the following website: [email or web address]. By joining [employer]’s FollowMyHealth portal, you will also be able to view your health information using other applications (apps) compatible with our system.
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
## 2020-06-07 NOTE — ED PROVIDER NOTE - DIAGNOSTIC INTERPRETATION
Left shoulder xray - no acute fx or dislocation  Pelvis xray - no acute fx or dislocation  Left femur xray - no acute fx or dislocation  Left tib/fib xray - no acute fx or dislocation  Left forearm xray - no acute fx or dislocation  Left hand xray - no acute fx or dislocation

## 2020-06-07 NOTE — ED PROVIDER NOTE - NS ED ROS FT
Eyes:  No visual changes, eye pain or discharge.  ENMT:  +left sided neck pain.   Cardiac:  No chest pain, SOB or edema. No chest pain with exertion.  Respiratory:  No cough or respiratory distress. No hemoptysis. No history of asthma or RAD.  GI:  No nausea, vomiting, diarrhea or abdominal pain.  :  No dysuria, frequency or burning.  MS:  +left shoulder, forearm, hand, leg pain.   Neuro:  No headache or weakness.  No LOC.  Skin:  +abrasion left forearm.   Endocrine: No history of thyroid disease or diabetes.

## 2020-06-07 NOTE — ED PROVIDER NOTE - NSFOLLOWUPCLINICS_GEN_ALL_ED_FT
[facility]  Medicine  [address], [place]   Phone: [phone]  Fax:   Follow Up Time: 1-3 Days
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
## 2020-06-07 NOTE — ED PROVIDER NOTE - PHYSICAL EXAMINATION
CONSTITUTIONAL: Well-developed; well-nourished; in no acute distress.   SKIN: +superficial abrasion left forearm   HEAD: Normocephalic; no skull indentations, no contusions or lacerations  EYES: no gross trauma bilaterally, no proptosis  ENT: No nasal discharge; airway clear. no septal hematoma or hemotypanum; no racoon eyes no greene sign  NECK: +left paraspinal cervical ttp. normal ROM   CHEST: no crepitus or bruising  CARD: S1, S2 normal; no murmurs, gallops, or rubs. Regular rate and rhythm.   RESP: No wheezes, rales or rhonchi.  ABD: soft ntnd  BACK: no midline tenderness or step offs  PELVIS: no laxity with lateral compression  EXT: +no obvious deformity. +ttp left forearm. +ttp left femur +pain with digit 4 and 5 flexion.   NEURO: gcs 15, moving all extremities grossly, following commands  PSYCH: Cooperative, appropriate
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
## 2020-07-11 ENCOUNTER — EMERGENCY (EMERGENCY)
Facility: HOSPITAL | Age: 40
LOS: 0 days | Discharge: HOME | End: 2020-07-11
Attending: EMERGENCY MEDICINE | Admitting: EMERGENCY MEDICINE
Payer: MEDICAID

## 2020-07-11 VITALS
DIASTOLIC BLOOD PRESSURE: 91 MMHG | RESPIRATION RATE: 18 BRPM | OXYGEN SATURATION: 100 % | SYSTOLIC BLOOD PRESSURE: 155 MMHG | HEART RATE: 86 BPM | TEMPERATURE: 97 F

## 2020-07-11 DIAGNOSIS — E11.9 TYPE 2 DIABETES MELLITUS WITHOUT COMPLICATIONS: ICD-10-CM
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
## 2020-07-11 DIAGNOSIS — Z79.84 LONG TERM (CURRENT) USE OF ORAL HYPOGLYCEMIC DRUGS: ICD-10-CM

## 2020-07-11 DIAGNOSIS — Z79.1 LONG TERM (CURRENT) USE OF NON-STEROIDAL ANTI-INFLAMMATORIES (NSAID): ICD-10-CM

## 2020-07-11 DIAGNOSIS — M25.519 PAIN IN UNSPECIFIED SHOULDER: ICD-10-CM

## 2020-07-11 DIAGNOSIS — Z87.81 PERSONAL HISTORY OF (HEALED) TRAUMATIC FRACTURE: Chronic | ICD-10-CM

## 2020-07-11 DIAGNOSIS — M54.2 CERVICALGIA: ICD-10-CM

## 2020-07-11 DIAGNOSIS — Z98.890 OTHER SPECIFIED POSTPROCEDURAL STATES: Chronic | ICD-10-CM
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
## 2020-07-11 DIAGNOSIS — Z79.899 OTHER LONG TERM (CURRENT) DRUG THERAPY: ICD-10-CM

## 2020-07-11 PROCEDURE — 99284 EMERGENCY DEPT VISIT MOD MDM: CPT

## 2020-07-11 RX ORDER — METHOCARBAMOL 500 MG/1
1 TABLET, FILM COATED ORAL
Qty: 10 | Refills: 0
Start: 2020-07-11 | End: 2020-07-15

## 2020-07-11 RX ORDER — DEXAMETHASONE 0.5 MG/5ML
10 ELIXIR ORAL ONCE
Refills: 0 | Status: COMPLETED | OUTPATIENT
Start: 2020-07-11 | End: 2020-07-11

## 2020-07-11 RX ORDER — METHOCARBAMOL 500 MG/1
1500 TABLET, FILM COATED ORAL ONCE
Refills: 0 | Status: COMPLETED | OUTPATIENT
Start: 2020-07-11 | End: 2020-07-11

## 2020-07-11 RX ORDER — OXYCODONE AND ACETAMINOPHEN 5; 325 MG/1; MG/1
1 TABLET ORAL ONCE
Refills: 0 | Status: DISCONTINUED | OUTPATIENT
Start: 2020-07-11 | End: 2020-07-11

## 2020-07-11 RX ORDER — KETOROLAC TROMETHAMINE 30 MG/ML
30 SYRINGE (ML) INJECTION ONCE
Refills: 0 | Status: DISCONTINUED | OUTPATIENT
Start: 2020-07-11 | End: 2020-07-11

## 2020-07-11 RX ADMIN — Medication 10 MILLIGRAM(S): at 18:52

## 2020-07-11 RX ADMIN — METHOCARBAMOL 1500 MILLIGRAM(S): 500 TABLET, FILM COATED ORAL at 18:39

## 2020-07-11 RX ADMIN — OXYCODONE AND ACETAMINOPHEN 1 TABLET(S): 5; 325 TABLET ORAL at 19:32

## 2020-07-11 RX ADMIN — Medication 30 MILLIGRAM(S): at 18:39

## 2020-07-11 NOTE — ED PROVIDER NOTE - PHYSICAL EXAMINATION
Gen: NAD, AOx3  Head: NCAT  HEENT: PERRL, oral mucosa moist, normal conjunctiva, oropharynx clear without exudate or erythema  Lung: CTAB, no respiratory distress, no wheezing, rales, rhonchi  CV: normal s1/s2, rrr, Normal perfusion, pulses 2+ throughout  Abd: soft, NTND, no CVA tenderness  Genitourinary: no pelvic tenderness  MSK: No edema, no visible deformities, full range of motion in all 4 extremities, no spinal tenderness, TTP R cervical/upper thoracic paraspinal area  Neuro: CN II-XII grossly intact, No focal neurologic deficits, no nystagmus/pronator drift, coordination/sensation intact, strength 5/5 BUE/BLE, steady gait   Skin: No rash   Psych: normal affect

## 2020-07-11 NOTE — ED PROVIDER NOTE - NSFOLLOWUPCLINICS_GEN_ALL_ED_FT
[facility] ([facility])  Rehabilitation  [facility] [address], [address] [place] [address]  Phone: [phone]  Fax:   Follow Up Time: 1-3 Days    Neurosurgery at [place]  Neurosurgery  [address] [place] [address], [address]  [place] [address]  Phone: [phone]  Fax:   Follow Up Time: 1-3 Days

## 2020-07-11 NOTE — ED ADULT NURSE NOTE - IS THE PATIENT ABLE TO BE SCREENED?
Telephone Encounter by Shania Ta NCMA at 08/09/18 03:30 PM     Author:  Shania Ta NCMA Service:  (none) Author Type:  Certified Medical Assistant     Filed:  08/09/18 03:30 PM Encounter Date:  8/9/2018 Status:  Signed     :  Shania Ta NCMA (Certified Medical Assistant)       From: Mihaela Lacey  To: Joleen Velazquez MD  Sent: 8/9/2018  3:02 PM CDT  Subject: Medication Questions    Hello Doctor,    I have finished my first 30 day of medicine and just ordered a refill?    When do i get my blood retested?    Rgds/Bogdan Lacey       Revision History        Date/Time User Provider Type Action    > 08/09/18 03:30 PM Shania Ta NCMA Certified Medical Assistant Sign    Attribution information within the note text is not available.            
No
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
## 2020-07-11 NOTE — ED PROVIDER NOTE - CARE PROVIDER_API CALL
Konstantin Swenson (MD)  Anesthesiology; Pain Medicine  [address] [place] [address]  Phone: [phone]  Fax: [phone]  Follow Up Time: 1-3 Days
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
## 2020-07-11 NOTE — ED PROVIDER NOTE - NS ED ROS FT
Constitutional: (-) fever  Eyes/ENT: (-) visual changes   Cardiovascular: (-) chest pain, (-) syncope  Respiratory: (-) cough, (-) shortness of breath  Gastrointestinal: (-) vomiting, (-) diarrhea  Genitourinary: (-) dysuria, (-) hesitancy, (-) frequency   Musculoskeletal: (+) neck pain, (+) back pain, (-) joint pain  Integumentary: (-) rash, (-) edema  Neurological: (-) headache, (-) altered mental status  Allergic/Immunologic: (-) pruritus

## 2020-07-11 NOTE — ED ADULT NURSE NOTE - OBJECTIVE STATEMENT
Pt presents c/o right shoulder pain , denies numbness , denies tingling sensation , AO  x 45 , no SOB , ambulatory , +CMS

## 2020-07-11 NOTE — ED PROVIDER NOTE - NSFOLLOWUPINSTRUCTIONS_ED_ALL_ED_FT
Please follow up with your primary care physician within 24-72 hours and return immediately if symptoms worsen.    Back Pain    WHAT YOU NEED TO KNOW:    Back pain is common. It can be caused by many conditions, such as arthritis or the breakdown of spinal discs. Your risk for back pain is increased by injuries, lack of activity, or repeated bending and twisting. You may feel sore or stiff on one or both sides of your back. The pain may spread to your buttocks or thighs.    DISCHARGE INSTRUCTIONS:    Return to the emergency department if:     You have pain, numbness, or weakness in one or both legs.      Your pain becomes so severe that you cannot walk.      You cannot control your urine or bowel movements.      You have severe back pain with chest pain.      You have severe back pain, nausea, and vomiting.      You have severe back pain that spreads to your side or genital area.    Contact your healthcare provider if:     You have back pain that does not get better with rest and pain medicine.      You have a fever.      You have pain that worsens when you are on your back or when you rest.      You have pain that worsens when you cough or sneeze.      You lose weight without trying.      You have questions or concerns about your condition or care.    Medicines:     NSAIDs help decrease swelling and pain. This medicine is available with or without a doctor's order. NSAIDs can cause stomach bleeding or kidney problems in certain people. If you take blood thinner medicine, always ask your healthcare provider if NSAIDs are safe for you. Always read the medicine label and follow directions.      Acetaminophen decreases pain and fever. It is available without a doctor's order. Ask how much to take and how often to take it. Follow directions. Read the labels of all other medicines you are using to see if they also contain acetaminophen, or ask your doctor or pharmacist. Acetaminophen can cause liver damage if not taken correctly. Do not use more than 4 grams (4,000 milligrams) total of acetaminophen in one day.       Muscle relaxers help decrease muscle spasms and back pain.      Prescription pain medicine may be given. Ask your healthcare provider how to take this medicine safely. Some prescription pain medicines contain acetaminophen. Do not take other medicines that contain acetaminophen without talking to your healthcare provider. Too much acetaminophen may cause liver damage. Prescription pain medicine may cause constipation. Ask your healthcare provider how to prevent or treat constipation.       Take your medicine as directed. Contact your healthcare provider if you think your medicine is not helping or if you have side effects. Tell him or her if you are allergic to any medicine. Keep a list of the medicines, vitamins, and herbs you take. Include the amounts, and when and why you take them. Bring the list or the pill bottles to follow-up visits. Carry your medicine list with you in case of an emergency.    How to manage your back pain:     Apply ice on your back for 15 to 20 minutes every hour or as directed. Use an ice pack, or put crushed ice in a plastic bag. Cover it with a towel before you apply it to your skin. Ice helps prevent tissue damage and decreases pain.      Apply heat on your back for 20 to 30 minutes every 2 hours for as many days as directed. Heat helps decrease pain and muscle spasms.      Stay active as much as you can without causing more pain. Bed rest could make your back pain worse. Avoid heavy lifting until your pain is gone.      Go to physical therapy as directed. A physical therapist can teach you exercises to help improve movement and strength, and to decrease pain.    Follow up with your healthcare provider in 2 weeks, or as directed: Write down your questions so you remember to ask them during your visits.       © Copyright [employer] 2019 All illustrations and images included in CareNotes are the copyrighted property of [employer] or [employer].

## 2020-07-11 NOTE — ED PROVIDER NOTE - PATIENT PORTAL LINK FT
You can access the FollowMyHealth Patient Portal offered by [facility] by registering at the following website: [email or web address]. By joining [employer]’s FollowMyHealth portal, you will also be able to view your health information using other applications (apps) compatible with our system.

## 2020-07-11 NOTE — ED PROVIDER NOTE - CLINICAL SUMMARY MEDICAL DECISION MAKING FREE TEXT BOX
pt with neck and upper back pain, likely msk pain v. radiculopathy, no signs of spinal cord compression and had recent mri, will rx symtpomatic treatment and pt to f/u with neurosurg and pain mgt

## 2020-07-12 ENCOUNTER — TRANSCRIPTION ENCOUNTER (OUTPATIENT)
Age: 40
End: 2020-07-12

## 2020-07-30 ENCOUNTER — TRANSCRIPTION ENCOUNTER (OUTPATIENT)
Age: 40
End: 2020-07-30

## 2020-09-14 NOTE — H&P PST ADULT - PRIMARY CARE PROVIDER
MRI Contrast Discharge Instructions    The IV contrast you received today will pass out of your body in your  urine. This will happen in the next 24 hours. You will not feel this process.  Your urine will not change color.    Drink at least 4 extra glasses of water or juice today (unless your doctor  has restricted your fluids). This reduces the stress on your kidneys.  You may take your regular medicines.    If you are on dialysis: It is best to have dialysis today.    If you have a reaction: Most reactions happen right away. If you have  any new symptoms after leaving the hospital (such as hives or swelling),  call your hospital at the correct number below. Or call your family doctor.  If you have breathing distress or wheezing, call 911.    Special instructions: ***    I have read and understand the above information.    Signature:______________________________________ Date:___________    Staff:__________________________________________ Date:___________     Time:__________    [facility] Departments:    ___[place]: [phone]  ___[facility]: [phone]  ___[place]: [phone] ___[facility]: [phone]  ___[facility]: [phone]  ___[facility]: [phone]  ___[phone]  ___[facility]: [phone]  ___[phone]   pmd: rose marie(1/20)

## 2021-02-01 PROCEDURE — G9005: CPT

## 2021-02-22 ENCOUNTER — INPATIENT (INPATIENT)
Facility: HOSPITAL | Age: 41
LOS: 2 days | Discharge: HOME | End: 2021-02-25
Attending: INTERNAL MEDICINE | Admitting: HOSPITALIST
Payer: MEDICAID

## 2021-02-22 VITALS
HEART RATE: 90 BPM | TEMPERATURE: 98 F | SYSTOLIC BLOOD PRESSURE: 142 MMHG | OXYGEN SATURATION: 98 % | RESPIRATION RATE: 18 BRPM | DIASTOLIC BLOOD PRESSURE: 96 MMHG | HEIGHT: 64 IN

## 2021-02-22 DIAGNOSIS — Z87.81 PERSONAL HISTORY OF (HEALED) TRAUMATIC FRACTURE: Chronic | ICD-10-CM

## 2021-02-22 DIAGNOSIS — Z98.890 OTHER SPECIFIED POSTPROCEDURAL STATES: Chronic | ICD-10-CM

## 2021-02-22 LAB
ALBUMIN SERPL ELPH-MCNC: 4.5 G/DL — SIGNIFICANT CHANGE UP (ref 3.5–5.2)
ALP SERPL-CCNC: 75 U/L — SIGNIFICANT CHANGE UP (ref 30–115)
ALT FLD-CCNC: 32 U/L — SIGNIFICANT CHANGE UP (ref 0–41)
ANION GAP SERPL CALC-SCNC: 12 MMOL/L — SIGNIFICANT CHANGE UP (ref 7–14)
APPEARANCE UR: CLEAR — SIGNIFICANT CHANGE UP
AST SERPL-CCNC: 24 U/L — SIGNIFICANT CHANGE UP (ref 0–41)
B-OH-BUTYR SERPL-SCNC: <0.2 MMOL/L — SIGNIFICANT CHANGE UP
BASE EXCESS BLDV CALC-SCNC: 3.2 MMOL/L — HIGH (ref -2–2)
BASOPHILS # BLD AUTO: 0.02 K/UL — SIGNIFICANT CHANGE UP (ref 0–0.2)
BASOPHILS NFR BLD AUTO: 0.4 % — SIGNIFICANT CHANGE UP (ref 0–1)
BILIRUB SERPL-MCNC: 0.4 MG/DL — SIGNIFICANT CHANGE UP (ref 0.2–1.2)
BILIRUB UR-MCNC: NEGATIVE — SIGNIFICANT CHANGE UP
BUN SERPL-MCNC: 13 MG/DL — SIGNIFICANT CHANGE UP (ref 10–20)
CA-I SERPL-SCNC: 1.12 MMOL/L — SIGNIFICANT CHANGE UP (ref 1.12–1.3)
CALCIUM SERPL-MCNC: 9.3 MG/DL — SIGNIFICANT CHANGE UP (ref 8.5–10.1)
CHLORIDE SERPL-SCNC: 98 MMOL/L — SIGNIFICANT CHANGE UP (ref 98–110)
CO2 SERPL-SCNC: 26 MMOL/L — SIGNIFICANT CHANGE UP (ref 17–32)
COLOR SPEC: SIGNIFICANT CHANGE UP
CREAT SERPL-MCNC: 1.1 MG/DL — SIGNIFICANT CHANGE UP (ref 0.7–1.5)
D DIMER BLD IA.RAPID-MCNC: 44 NG/ML DDU — SIGNIFICANT CHANGE UP (ref 0–230)
DIFF PNL FLD: NEGATIVE — SIGNIFICANT CHANGE UP
EOSINOPHIL # BLD AUTO: 0.03 K/UL — SIGNIFICANT CHANGE UP (ref 0–0.7)
EOSINOPHIL NFR BLD AUTO: 0.6 % — SIGNIFICANT CHANGE UP (ref 0–8)
GAS PNL BLDV: 135 MMOL/L — LOW (ref 136–145)
GAS PNL BLDV: SIGNIFICANT CHANGE UP
GLUCOSE SERPL-MCNC: 319 MG/DL — HIGH (ref 70–99)
GLUCOSE UR QL: ABNORMAL
HCO3 BLDV-SCNC: 28 MMOL/L — SIGNIFICANT CHANGE UP (ref 22–29)
HCT VFR BLD CALC: 44.7 % — SIGNIFICANT CHANGE UP (ref 42–52)
HCT VFR BLDA CALC: 45 % — HIGH (ref 34–44)
HGB BLD CALC-MCNC: 14.7 G/DL — SIGNIFICANT CHANGE UP (ref 14–18)
HGB BLD-MCNC: 15.1 G/DL — SIGNIFICANT CHANGE UP (ref 14–18)
IMM GRANULOCYTES NFR BLD AUTO: 0.2 % — SIGNIFICANT CHANGE UP (ref 0.1–0.3)
KETONES UR-MCNC: NEGATIVE — SIGNIFICANT CHANGE UP
LACTATE BLDV-MCNC: 2.3 MMOL/L — HIGH (ref 0.5–1.6)
LACTATE SERPL-SCNC: 1.6 MMOL/L — SIGNIFICANT CHANGE UP (ref 0.7–2)
LEUKOCYTE ESTERASE UR-ACNC: NEGATIVE — SIGNIFICANT CHANGE UP
LIDOCAIN IGE QN: 28 U/L — SIGNIFICANT CHANGE UP (ref 7–60)
LYMPHOCYTES # BLD AUTO: 1.09 K/UL — LOW (ref 1.2–3.4)
LYMPHOCYTES # BLD AUTO: 22.3 % — SIGNIFICANT CHANGE UP (ref 20.5–51.1)
MAGNESIUM SERPL-MCNC: 2.1 MG/DL — SIGNIFICANT CHANGE UP (ref 1.8–2.4)
MCHC RBC-ENTMCNC: 29.7 PG — SIGNIFICANT CHANGE UP (ref 27–31)
MCHC RBC-ENTMCNC: 33.8 G/DL — SIGNIFICANT CHANGE UP (ref 32–37)
MCV RBC AUTO: 88 FL — SIGNIFICANT CHANGE UP (ref 80–94)
MONOCYTES # BLD AUTO: 0.31 K/UL — SIGNIFICANT CHANGE UP (ref 0.1–0.6)
MONOCYTES NFR BLD AUTO: 6.4 % — SIGNIFICANT CHANGE UP (ref 1.7–9.3)
NEUTROPHILS # BLD AUTO: 3.42 K/UL — SIGNIFICANT CHANGE UP (ref 1.4–6.5)
NEUTROPHILS NFR BLD AUTO: 70.1 % — SIGNIFICANT CHANGE UP (ref 42.2–75.2)
NITRITE UR-MCNC: NEGATIVE — SIGNIFICANT CHANGE UP
NRBC # BLD: 0 /100 WBCS — SIGNIFICANT CHANGE UP (ref 0–0)
NT-PROBNP SERPL-SCNC: 30 PG/ML — SIGNIFICANT CHANGE UP (ref 0–300)
PCO2 BLDV: 45 MMHG — SIGNIFICANT CHANGE UP (ref 41–51)
PH BLDV: 7.41 — SIGNIFICANT CHANGE UP (ref 7.26–7.43)
PH UR: 6.5 — SIGNIFICANT CHANGE UP (ref 5–8)
PLATELET # BLD AUTO: 297 K/UL — SIGNIFICANT CHANGE UP (ref 130–400)
PO2 BLDV: 56 MMHG — HIGH (ref 20–40)
POTASSIUM BLDV-SCNC: 4.2 MMOL/L — SIGNIFICANT CHANGE UP (ref 3.3–5.6)
POTASSIUM SERPL-MCNC: 4.6 MMOL/L — SIGNIFICANT CHANGE UP (ref 3.5–5)
POTASSIUM SERPL-SCNC: 4.6 MMOL/L — SIGNIFICANT CHANGE UP (ref 3.5–5)
PROT SERPL-MCNC: 7 G/DL — SIGNIFICANT CHANGE UP (ref 6–8)
PROT UR-MCNC: NEGATIVE — SIGNIFICANT CHANGE UP
RBC # BLD: 5.08 M/UL — SIGNIFICANT CHANGE UP (ref 4.7–6.1)
RBC # FLD: 12.8 % — SIGNIFICANT CHANGE UP (ref 11.5–14.5)
SAO2 % BLDV: 89 % — SIGNIFICANT CHANGE UP
SARS-COV-2 RNA SPEC QL NAA+PROBE: SIGNIFICANT CHANGE UP
SODIUM SERPL-SCNC: 136 MMOL/L — SIGNIFICANT CHANGE UP (ref 135–146)
SP GR SPEC: 1.02 — SIGNIFICANT CHANGE UP (ref 1.01–1.03)
TROPONIN T SERPL-MCNC: <0.01 NG/ML — SIGNIFICANT CHANGE UP
TROPONIN T SERPL-MCNC: <0.01 NG/ML — SIGNIFICANT CHANGE UP
UROBILINOGEN FLD QL: SIGNIFICANT CHANGE UP
WBC # BLD: 4.88 K/UL — SIGNIFICANT CHANGE UP (ref 4.8–10.8)
WBC # FLD AUTO: 4.88 K/UL — SIGNIFICANT CHANGE UP (ref 4.8–10.8)

## 2021-02-22 PROCEDURE — 93010 ELECTROCARDIOGRAM REPORT: CPT

## 2021-02-22 PROCEDURE — 71045 X-RAY EXAM CHEST 1 VIEW: CPT | Mod: 26

## 2021-02-22 PROCEDURE — 99220: CPT

## 2021-02-22 PROCEDURE — 93010 ELECTROCARDIOGRAM REPORT: CPT | Mod: 77

## 2021-02-22 RX ORDER — SODIUM CHLORIDE 9 MG/ML
1000 INJECTION, SOLUTION INTRAVENOUS ONCE
Refills: 0 | Status: COMPLETED | OUTPATIENT
Start: 2021-02-22 | End: 2021-02-22

## 2021-02-22 RX ORDER — ACETAMINOPHEN 500 MG
975 TABLET ORAL ONCE
Refills: 0 | Status: COMPLETED | OUTPATIENT
Start: 2021-02-22 | End: 2021-02-22

## 2021-02-22 RX ORDER — KETOROLAC TROMETHAMINE 30 MG/ML
15 SYRINGE (ML) INJECTION ONCE
Refills: 0 | Status: DISCONTINUED | OUTPATIENT
Start: 2021-02-22 | End: 2021-02-22

## 2021-02-22 RX ADMIN — SODIUM CHLORIDE 1000 MILLILITER(S): 9 INJECTION, SOLUTION INTRAVENOUS at 17:19

## 2021-02-22 RX ADMIN — Medication 15 MILLIGRAM(S): at 20:31

## 2021-02-22 RX ADMIN — Medication 975 MILLIGRAM(S): at 20:31

## 2021-02-22 RX ADMIN — Medication 15 MILLIGRAM(S): at 17:19

## 2021-02-22 RX ADMIN — SODIUM CHLORIDE 1000 MILLILITER(S): 9 INJECTION, SOLUTION INTRAVENOUS at 19:03

## 2021-02-22 NOTE — ED PROVIDER NOTE - OBJECTIVE STATEMENT
41 y.o. M with PMH of DM and cervical fusion last year with elevated blood sugar chest pain, shortness of breath , no nausea/vomitting, no dysuria or hematuria.
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
## 2021-02-22 NOTE — ED ADULT NURSE NOTE - OBJECTIVE STATEMENT
Pt presented with c/o SOB/chest pressure/elevated blood sugars. Pt states his FS has been elevated for the past couple of days prior to taking his medications. Denies n/v/fever/cough/diarrhea/dizziness. Alert and oriented x3.

## 2021-02-22 NOTE — ED PROVIDER NOTE - ATTENDING CONTRIBUTION TO CARE
41 yr old m w/ a pmh significant for dm who presents due to elevated fsg, sob, and chest pain. Pt states that the symptoms started yesterday. Pt states that the chest pain is midsternal, non radiating, and not associated with exertion. Pt does state that he has also been feeling sob with the chest pain.  Of note, pt states that this morning his FSG was >500 before taking his medications.   Pt denies any fevers, chills, nausea, vomiting, but does endorse polyuria and polydipsia.     VITAL SIGNS: I have reviewed nursing notes and confirm.  CONSTITUTIONAL: non-toxic, well appearing  SKIN: no rash, no petechiae.  EYES: PERRL, EOMI, pink conjunctiva, anicteric  ENT: tongue midline, no exudates, MMM  NECK: Supple; no meningismus, no JVD  CARD: RRR, no murmurs, equal radial pulses bilaterally 2+  RESP: CTAB, no respiratory distress  ABD: Soft, non-tender, non-distended, no peritoneal signs, no HSM, no CVA tenderness  EXT: Normal ROM x4. No edema. No calves tenderness  NEURO: Alert, oriented. CN2-12 intact, equal strength bilaterally, nl gait.  PSYCH: Cooperative, appropriate.    a/p  41 yr old m that presents with chest pain/sob and elevated fsg  -labs  -ekg  -cxr  -IVF   -consider obs for acs evaluation. 41 yr old m w/ a pmh significant for dm who presents due to elevated fsg, sob, and chest pain. Pt states that the symptoms started yesterday. Pt states that the chest pain is midsternal, non radiating, and not associated with exertion. Pt does state that he has also been feeling sob with the chest pain.  Of note, pt states that this morning his FSG was >500 before taking his medications.   Pt denies any fevers, chills, nausea, vomiting, but does endorse polyuria and polydipsia.     VITAL SIGNS: I have reviewed nursing notes and confirm.  CONSTITUTIONAL: non-toxic, well appearing  SKIN: no rash, no petechiae.  EYES: PERRL, EOMI, pink conjunctiva, anicteric  ENT: tongue midline, no exudates, MMM  NECK: Supple; no meningismus, no JVD  CARD: RRR, no murmurs, equal radial pulses bilaterally 2+  RESP: CTAB, no respiratory distress  ABD: Soft, non-tender, non-distended, no peritoneal signs, no HSM, no CVA tenderness  EXT: Normal ROM x4. No edema. No calves tenderness  NEURO: Alert, oriented. CN2-12 intact, equal strength bilaterally, nl gait.  PSYCH: Cooperative, appropriate.    a/p  41 yr old m that presents with chest pain/sob and elevated fsg  -PERC negative   -labs  -ekg  -cxr  -IVF   -consider obs for acs evaluation.
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
## 2021-02-22 NOTE — ED PROVIDER NOTE - PROGRESS NOTE DETAILS
BI: pt endorsed to me by Dr. Platt. Pt w/ pmhx DM p/w CP sternal, pressure that began on saturday with stress. Pt has never had such symptoms before. A/w SOB and arm tingling. Pt has never had cardiac workup in the past. Lungs CTABL, RRR no murmurs, no JVD, no leg swelling, peripheral pulses intact.

## 2021-02-22 NOTE — ED PROVIDER NOTE - CLINICAL SUMMARY MEDICAL DECISION MAKING FREE TEXT BOX
41 yr old m who presents with chest pain, sob, elevated fsg. labs, ekg, cxr obtained. pt given IVF, not in dka. will admit to obs for acs.

## 2021-02-22 NOTE — ED ADULT NURSE NOTE - NSIMPLEMENTINTERV_GEN_ALL_ED
Implemented All Universal Safety Interventions:  [place] to call system. Call bell, personal items and telephone within reach. Instruct patient to call for assistance. Room bathroom lighting operational. Non-slip footwear when patient is off stretcher. Physically safe environment: no spills, clutter or unnecessary equipment. Stretcher in lowest position, wheels locked, appropriate side rails in place.

## 2021-02-22 NOTE — ED CDU PROVIDER INITIAL DAY NOTE - NS ED ROS FT
Review of Systems  Constitutional:  No fever, chills.  Eyes:  No visual changes, eye pain, or discharge.  ENMT:  No hearing changes, pain, or discharge. No nasal congestion, discharge, or bleeding. No throat pain, swelling, or difficulty swallowing.  Cardiac:  No palpitations, syncope, or edema. (+) chest pain  Respiratory:  No dyspnea, cough. No hemoptysis.  GI:  No nausea, vomiting, diarrhea, or abdominal pain.   :  No dysuria, hematuria, frequency, or burning.   MS:  No back pain.  Skin:  No skin rash, pruritis, jaundice, or lesions.  Neuro:  No headache, dizziness, loss of sensation, or focal weakness.  No change in mental status.   Endocrine: No history of thyroid disease. (+) DM

## 2021-02-22 NOTE — ED CDU PROVIDER INITIAL DAY NOTE - PROGRESS NOTE DETAILS
Spoke with cardiology re: CCTA results (possible moderate narrowing in proximal to mid LAD v artifact). Requests admission and cardiology will provide consultation. Patient amenable.

## 2021-02-23 DIAGNOSIS — Z98.1 ARTHRODESIS STATUS: Chronic | ICD-10-CM

## 2021-02-23 LAB
GLUCOSE BLDC GLUCOMTR-MCNC: 265 MG/DL — HIGH (ref 70–99)
TROPONIN T SERPL-MCNC: <0.01 NG/ML — SIGNIFICANT CHANGE UP

## 2021-02-23 PROCEDURE — 75574 CT ANGIO HRT W/3D IMAGE: CPT | Mod: 26

## 2021-02-23 PROCEDURE — 93010 ELECTROCARDIOGRAM REPORT: CPT

## 2021-02-23 PROCEDURE — 99217: CPT

## 2021-02-23 PROCEDURE — 99223 1ST HOSP IP/OBS HIGH 75: CPT | Mod: AI

## 2021-02-23 PROCEDURE — 93010 ELECTROCARDIOGRAM REPORT: CPT | Mod: 77

## 2021-02-23 RX ORDER — METFORMIN HYDROCHLORIDE 850 MG/1
1 TABLET ORAL
Qty: 0 | Refills: 0 | DISCHARGE

## 2021-02-23 RX ORDER — METOPROLOL TARTRATE 50 MG
50 TABLET ORAL ONCE
Refills: 0 | Status: COMPLETED | OUTPATIENT
Start: 2021-02-23 | End: 2021-02-23

## 2021-02-23 RX ORDER — ATORVASTATIN CALCIUM 80 MG/1
40 TABLET, FILM COATED ORAL AT BEDTIME
Refills: 0 | Status: DISCONTINUED | OUTPATIENT
Start: 2021-02-23 | End: 2021-02-25

## 2021-02-23 RX ORDER — ASPIRIN/CALCIUM CARB/MAGNESIUM 324 MG
81 TABLET ORAL DAILY
Refills: 0 | Status: DISCONTINUED | OUTPATIENT
Start: 2021-02-23 | End: 2021-02-25

## 2021-02-23 RX ORDER — INSULIN LISPRO 100/ML
3 VIAL (ML) SUBCUTANEOUS
Refills: 0 | Status: DISCONTINUED | OUTPATIENT
Start: 2021-02-23 | End: 2021-02-25

## 2021-02-23 RX ORDER — INSULIN GLARGINE 100 [IU]/ML
9 INJECTION, SOLUTION SUBCUTANEOUS EVERY MORNING
Refills: 0 | Status: DISCONTINUED | OUTPATIENT
Start: 2021-02-23 | End: 2021-02-25

## 2021-02-23 RX ORDER — DEXTROSE 50 % IN WATER 50 %
15 SYRINGE (ML) INTRAVENOUS ONCE
Refills: 0 | Status: DISCONTINUED | OUTPATIENT
Start: 2021-02-23 | End: 2021-02-25

## 2021-02-23 RX ORDER — FEXOFENADINE HCL 30 MG
1 TABLET ORAL
Qty: 0 | Refills: 0 | DISCHARGE

## 2021-02-23 RX ORDER — INSULIN LISPRO 100/ML
VIAL (ML) SUBCUTANEOUS
Refills: 0 | Status: DISCONTINUED | OUTPATIENT
Start: 2021-02-23 | End: 2021-02-25

## 2021-02-23 RX ORDER — DEXTROSE 50 % IN WATER 50 %
25 SYRINGE (ML) INTRAVENOUS ONCE
Refills: 0 | Status: DISCONTINUED | OUTPATIENT
Start: 2021-02-23 | End: 2021-02-25

## 2021-02-23 RX ORDER — SODIUM CHLORIDE 9 MG/ML
1000 INJECTION, SOLUTION INTRAVENOUS
Refills: 0 | Status: DISCONTINUED | OUTPATIENT
Start: 2021-02-23 | End: 2021-02-25

## 2021-02-23 RX ORDER — ASPIRIN/CALCIUM CARB/MAGNESIUM 324 MG
325 TABLET ORAL ONCE
Refills: 0 | Status: COMPLETED | OUTPATIENT
Start: 2021-02-23 | End: 2021-02-23

## 2021-02-23 RX ORDER — METOPROLOL TARTRATE 50 MG
100 TABLET ORAL ONCE
Refills: 0 | Status: COMPLETED | OUTPATIENT
Start: 2021-02-23 | End: 2021-02-23

## 2021-02-23 RX ORDER — PANTOPRAZOLE SODIUM 20 MG/1
40 TABLET, DELAYED RELEASE ORAL
Refills: 0 | Status: DISCONTINUED | OUTPATIENT
Start: 2021-02-23 | End: 2021-02-25

## 2021-02-23 RX ORDER — GLUCAGON INJECTION, SOLUTION 0.5 MG/.1ML
1 INJECTION, SOLUTION SUBCUTANEOUS ONCE
Refills: 0 | Status: DISCONTINUED | OUTPATIENT
Start: 2021-02-23 | End: 2021-02-25

## 2021-02-23 RX ORDER — MORPHINE SULFATE 50 MG/1
2 CAPSULE, EXTENDED RELEASE ORAL ONCE
Refills: 0 | Status: DISCONTINUED | OUTPATIENT
Start: 2021-02-23 | End: 2021-02-23

## 2021-02-23 RX ORDER — DEXTROSE 50 % IN WATER 50 %
12.5 SYRINGE (ML) INTRAVENOUS ONCE
Refills: 0 | Status: DISCONTINUED | OUTPATIENT
Start: 2021-02-23 | End: 2021-02-25

## 2021-02-23 RX ORDER — METOPROLOL TARTRATE 50 MG
25 TABLET ORAL
Refills: 0 | Status: DISCONTINUED | OUTPATIENT
Start: 2021-02-23 | End: 2021-02-25

## 2021-02-23 RX ORDER — ENOXAPARIN SODIUM 100 MG/ML
40 INJECTION SUBCUTANEOUS DAILY
Refills: 0 | Status: DISCONTINUED | OUTPATIENT
Start: 2021-02-23 | End: 2021-02-25

## 2021-02-23 RX ADMIN — Medication 325 MILLIGRAM(S): at 16:52

## 2021-02-23 RX ADMIN — MORPHINE SULFATE 2 MILLIGRAM(S): 50 CAPSULE, EXTENDED RELEASE ORAL at 22:53

## 2021-02-23 RX ADMIN — Medication 100 MILLIGRAM(S): at 06:28

## 2021-02-23 RX ADMIN — ATORVASTATIN CALCIUM 40 MILLIGRAM(S): 80 TABLET, FILM COATED ORAL at 22:00

## 2021-02-23 RX ADMIN — Medication 50 MILLIGRAM(S): at 08:24

## 2021-02-23 RX ADMIN — Medication 50 MILLIGRAM(S): at 09:35

## 2021-02-23 NOTE — H&P ADULT - NSHPLABSRESULTS_GEN_ALL_CORE
15.1   4.88  )-----------( 297      ( 2021 16:50 )             44.7           136  |  98  |  13  ----------------------------<  319<H>  4.6   |  26  |  1.1    Ca    9.3      2021 16:50  Mg     2.1         TPro  7.0  /  Alb  4.5  /  TBili  0.4  /  DBili  x   /  AST  24  /  ALT  32  /  AlkPhos  75        LIVER FUNCTIONS - ( 2021 16:50 )  Alb: 4.5 g/dL / Pro: 7.0 g/dL / ALK PHOS: 75 U/L / ALT: 32 U/L / AST: 24 U/L / GGT: x                 Urinalysis Basic - ( 2021 13:20 )    Color: Light Yellow / Appearance: Clear / S.023 / pH: x  Gluc: x / Ketone: Negative  / Bili: Negative / Urobili: <2 mg/dL   Blood: x / Protein: Negative / Nitrite: Negative   Leuk Esterase: Negative / RBC: x / WBC x   Sq Epi: x / Non Sq Epi: x / Bacteria: x            CARDIAC MARKERS ( 2021 21:03 )  x     / <0.01 ng/mL / x     / x     / x      CARDIAC MARKERS ( 2021 16:50 )  x     / <0.01 ng/mL / x     / x     / x        < from: CT Angio Heart and Coronaries w/ IV Cont (21 @ 12:49) >    CORONARY CT ANGIOGRAM:    There is a right dominant coronary arterial system.    Left Main Artery: Patent with no evidence of plaque or stenosis.    Left Anterior Descending Artery: There is apparent moderate narrowing of the proximal to mid segment of LAD at the takeoff of the first diagonal. This finding does not persist on all cardiac phases raising the possibility of artifact.    Left Circumflex Artery: Patent with no evidence of plaque or stenosis.    Right Coronary Artery: Patent with no evidence of plaque or stenosis.    CARDIAC MORPHOLOGY: The cardiac chambers are normal in size.  There is no pericardial effusion.    IMAGED AORTA: The thoracic aorta is normal in caliber.    IMAGED EXTRACARDIAC FINDINGS:  No potentially significant noncardiac findings in the visualized portions of the chest and upper abdomen.  Cervical spine hardware.    IMPRESSION:  Apparent moderate narrowing versus artifact of the proximal to mid segment of LAD at the takeoff of the first diagonal as described above    The total Agatston coronary artery calcium score equals 0.    < end of copied text >

## 2021-02-23 NOTE — H&P ADULT - ATTENDING COMMENTS
42 YO M with a PMH of DM2 and FH of premature CAD who presents to the hospital with a c/o CP for the past x 3 days. Described as pressure, substernal, non-radiating, and intermittent. + worse with exertion. Associated with + SOB, + palpitations, - nausea, and - diaphoresis. Did not take SLN or ASA prior to arrival. Denies any fevers/chills, cough, ABD pain, LE swelling, or rashes.     In the ED, cardiac enzymes were negative and an EKG showed incomplete RBBB with no ischemic changes. ASA given in the ED. Pt originally admitted to OBS and a CCTA showed possible artifact vs pLAD narrowing. Cardio consulted and wants pt admitted to tele.     Physical exam shows pt in NAD, resting comfortably. VSS, afebrile, not hypoxic on RA. A&Ox3. Neuro exam intact. CTA B/L with no W/C/R. RRR, no M/G/R. ABD is soft and non-tender to palpation, normoactive BSs. LEs without swelling, pulses palpated bilaterally. No rashes. Labs and imaging as above resident note.     Chest pain, typical, rule out ACS. Admit to tele. Cardio is following. Serial cardiac enzymes and EKGs. A1c, Lipids, and TSH. ASA given in the ED. Echo. PRN pain meds. Restart home meds.  -Current cardio note does not mention possible CATH in the AM. Will await further recs.     Diabetes mellitus with hyperglycemia. A1c. FSs. Insulin PRN.     Restart home meds, except as stated above. DVT PPX. Inform PCP of pt's admission to hospital. My note supersedes the residents note.

## 2021-02-23 NOTE — ED CDU PROVIDER DISPOSITION NOTE - CLINICAL COURSE
41M with pmh NIDDM, HLD, who presented to [facility] for chest discomfort that developed after getting into an argument with his wife Saturday at 3am. Father + CABG at 53. Non-smoker. No prior cardiac workup. Labs and imaging reviewed. EKG non-ischemic x2, trop neg x2, cxr clear. LA 2.3->1.6. Patient had another episode of chest pain this morning while laying in bed, and ekg reviewed by me shows no evidence of ischemia. ASA given.CCTA showed apparent moderate narrowing versus artifact of the proximal to mid segment of LAD at the takeoff of the first diagonal. Spoke to cardiology who agreed to admit the patient.

## 2021-02-23 NOTE — H&P ADULT - NSICDXPASTSURGICALHX_GEN_ALL_CORE_FT
PAST SURGICAL HISTORY:  H/O fracture of tibia 2010    H/O wrist surgery     S/P cervical spinal fusion

## 2021-02-23 NOTE — CONSULT NOTE ADULT - SUBJECTIVE AND OBJECTIVE BOX
Date of Admission: 02-23-21    CHIEF COMPLAINT: Patient is a 41y old  Male who presents with a chief complaint of chest pain (23 Feb 2021 17:52)      HPI:  41 year old male with hx of type 2 DM and FH of premature CAD presents with chest pain and palpitations of 3 days duration.  Patient after an argument had severe chest pain described as chest pressure substernal occurred on Saturday night lasted for 10 min as severe and then decreased to begin a faint chest  pressure persistent since then. He states that the chest pressure got worse at work where he does physical activity on monday and thast why he presented to ED. Symptoms are associated with occasional palpitations and shortness of breath and patient admits to an element of anxiety   2 ecg in ED showed incomplete RBB with no acute ischemic signs, troponins 2 sets were negative   CCTA done showed mod narrowing vs artefact in mid-segment of LAD   patient admitted for cardiology evaluation and monitoring with need for further work up with probably cardiac catheterization  (23 Feb 2021 17:52)      PAST MEDICAL & SURGICAL HISTORY:  Diabetes    S/P cervical spinal fusion    H/O wrist surgery    H/O fracture of tibia  2010        FAMILY HISTORY:  FH: CABG (coronary artery bypass surgery)  at 50 father    History of type 1 diabetes mellitus in mother    SOCIAL HISTORY:    [x] Occasional smoker  [x] Social alcohol use  [x] No illicit drug use    Allergies: No Known Allergies    REVIEW OF SYSTEMS:  CONSTITUTIONAL: No fever, weight loss, or fatigue  CARDIOLOGY: (+) Chest pain, dyspnea of exertion. No syncopal episodes.   RESPIRATORY: No shortness of breath, cough, wheezing.   NEUROLOGICAL: No weakness, no focal deficits to report.  GI: No BRBPR, no N,V,diarrhea.    PSYCHIATRY: Normal mood and affect.  HEENT: No nasal discharge, no ecchymosis  SKIN: No ecchymosis, no breakdown  MUSCULOSKELETAL: Full range of motion x4.   EXTREM: No leg swelling or erythema.    PHYSICAL EXAM:  T(C): 36.3 (02-23-21 @ 15:32), Max: 36.3 (02-23-21 @ 15:32)  HR: 85 (02-23-21 @ 15:32) (66 - 85)  BP: 123/84 (02-23-21 @ 15:32) (100/64 - 130/69)  RR: 18 (02-23-21 @ 15:32) (18 - 18)  SpO2: 98% (02-23-21 @ 15:32) (97% - 99%)  Wt(kg): --  I&O's Summary      General Appearance: NAD, normal for age and gender. 	  Neck: Normal JVP, no bruit.   Eyes: No xanthomalasia, Extra Ocular muscles intact.   Cardiovascular: Regular rate and rhythm S1 S2, No JVD, No murmurs.  Respiratory: Lungs clear to auscultation. No wheezes, rales or rhonchi.  Psychiatry: Alert and oriented x 3, Mood & affect appropriate  Gastrointestinal:  Soft, Non-tender  Skin/Integumen: No rashes, No ecchymoses, No cyanosis	  Neurologic: Non-focal deficits.  Musculoskeletal/ extremities: Normal range of motion, No clubbing, cyanosis or edema  Vascular: Peripheral pulses palpable bilaterally    LABS:	 	                        15.1   4.88  )-----------( 297      ( 22 Feb 2021 16:50 )             44.7     02-22    136  |  98  |  13  ----------------------------<  319<H>  4.6   |  26  |  1.1    Ca    9.3      22 Feb 2021 16:50  Mg     2.1     02-22    TPro  7.0  /  Alb  4.5  /  TBili  0.4  /  DBili  x   /  AST  24  /  ALT  32  /  AlkPhos  75  02-22    CARDIAC MARKERS ( 22 Feb 2021 21:03 )  x     / <0.01 ng/mL / x     / x     / x      CARDIAC MARKERS ( 22 Feb 2021 16:50 )  x     / <0.01 ng/mL / x     / x     / x        TELEMETRY EVENTS: 	    ECG:  < from: 12 Lead ECG (02.23.21 @ 08:16) >  Diagnosis Line Normal sinus rhythm  Early repolarization  Normal ECG  	  RADIOLOGY: < from: Xray Chest 1 View- PORTABLE-Urgent (02.22.21 @ 16:44) >  No radiographic evidence of acute cardiopulmonary disease.    < from: CT Angio Heart and Coronaries w/ IV Cont (02.23.21 @ 12:49) >  INTERPRETATION:    CALCIUM SCORE:    The total Agatston coronary artery calcium score equals 0.    CORONARY CT ANGIOGRAM:    There is a right dominant coronary arterial system.    Left Main Artery: Patent with no evidence of plaque or stenosis.    Left Anterior Descending Artery: There is apparent moderate narrowing of the proximal to mid segment of LAD at the takeoff of the first diagonal. This finding does not persist on all cardiac phases raising the possibility of artifact.    Left Circumflex Artery: Patent with no evidence of plaque or stenosis.    Right Coronary Artery: Patent with no evidence of plaque or stenosis.    CARDIAC MORPHOLOGY: The cardiac chambers are normal in size.  There is no pericardial effusion.    IMAGED AORTA: The thoracic aorta is normal in caliber.    IMAGED EXTRACARDIAC FINDINGS:  No potentially significant noncardiac findings in the visualized portions of the chest and upper abdomen.  Cervical spine hardware.    IMPRESSION:  Apparent moderate narrowing versus artifact of the proximal to mid segment of LAD at the takeoff of the first diagonal as described above    The total Agatston coronary artery calcium score equals 0.    CAD-RADS 0 vs 3 (as described above).    OTHER: 	    PREVIOUS DIAGNOSTIC TESTING:    [ ] Echocardiogram:   [ ] Catheterization:  [ ] Stress Test:  	  	  Home Medications:  pioglitazone 30 mg oral tablet: 1 tab(s) orally once a day (23 Feb 2021 18:14)  semaglutide 7 mg oral tablet: 1 tab(s) orally once a day (23 Feb 2021 18:14)    MEDICATIONS  (STANDING):  aspirin  chewable 81 milliGRAM(s) Oral daily  atorvastatin 40 milliGRAM(s) Oral at bedtime  dextrose 40% Gel 15 Gram(s) Oral once  dextrose 5%. 1000 milliLiter(s) (50 mL/Hr) IV Continuous <Continuous>  dextrose 5%. 1000 milliLiter(s) (100 mL/Hr) IV Continuous <Continuous>  dextrose 50% Injectable 25 Gram(s) IV Push once  dextrose 50% Injectable 12.5 Gram(s) IV Push once  dextrose 50% Injectable 25 Gram(s) IV Push once  enoxaparin Injectable 40 milliGRAM(s) SubCutaneous daily  glucagon  Injectable 1 milliGRAM(s) IntraMuscular once  insulin glargine Injectable (LANTUS) 9 Unit(s) SubCutaneous every morning  insulin lispro (ADMELOG) corrective regimen sliding scale   SubCutaneous three times a day before meals  insulin lispro Injectable (ADMELOG) 3 Unit(s) SubCutaneous before breakfast  insulin lispro Injectable (ADMELOG) 3 Unit(s) SubCutaneous before lunch  insulin lispro Injectable (ADMELOG) 3 Unit(s) SubCutaneous before dinner  metoprolol tartrate 25 milliGRAM(s) Oral two times a day  pantoprazole    Tablet 40 milliGRAM(s) Oral before breakfast    MEDICATIONS  (PRN):

## 2021-02-23 NOTE — H&P ADULT - ASSESSMENT
Typical chest pain, non ischemic ecg (x2), negative troponin (x2) r/o ACS- UA  DM type 2, FH of premature CAD   CCTA showing mod narrowing vs artefact in mid segment of LAD     Plan:  -admit to telemetry   -cardiology consult for need for cardiac catheterization , will keep NPO after midnight tentatively   -started aspirin and statin and beta-blocker   -fu 2D echo, check telemetry for any arrythmia ? palpitations   -fu HBA1c/ lipid profile     -DM type 2   basal/bolus regimen   fu A1c  outpt fu with Dr Murray     GI ppx: Po protonix 40 mg QD  DVT ppx: Lovenox SC   Activity : ambulate as tolerated  Diet :  Carbohydrates consistent diet  Dispo: Home   Full Code  Typical chest pain, non ischemic ecg (x2), negative troponin (x2) r/o ACS- UA  DM type 2, FH of premature CAD   CCTA showing mod narrowing vs artefact in mid segment of LAD     Plan:  -admit to telemetry   -cardiology consult for need for cardiac catheterization ,vs pharmacologic stress test, will keep NPO after midnight tentatively   -started aspirin and statin and beta-blocker   -fu 2D echo, check telemetry for any arrythmia ? palpitations   -fu HBA1c/ lipid profile     -DM type 2   basal/bolus regimen   fu A1c  outpt fu with Dr Murray     GI ppx: Po protonix 40 mg QD  DVT ppx: Lovenox SC   Activity : ambulate as tolerated  Diet :  Carbohydrates consistent diet  Dispo: Home   Full Code

## 2021-02-23 NOTE — ED CDU PROVIDER SUBSEQUENT DAY NOTE - MEDICAL DECISION MAKING DETAILS
41M with pmh NIDDM, HLD, who presented to [facility] for chest discomfort that developed after getting into an argument with his wife Saturday at 3am. Father + CABG at 53. Non-smoker. No prior cardiac workup. Labs and imaging reviewed. EKG non-ischemic x2, trop neg x2, cxr clear. LA 2.3->1.6. Patient had another episode of chest pain this morning while laying in bed, and ekg reviewed by me shows no evidence of ischemia. ASA given. Plan is for ccta.

## 2021-02-23 NOTE — CONSULT NOTE ADULT - ASSESSMENT
Assessment:  Chest pain  Risk factors: DMII, FH(+) premature CAD    Plan:  telemetry  obtain lipid profile and [id number]  c/w diabetes management  risk factor modification Assessment:  Chest pain, ACS ruled out  Risk factors: DM II, FH (+) premature CAD, (+) Smoking    Plan:  telemetry  obtain lipid profile and [id number]  c/w diabetes management  risk factor modification  keep NPO after light breakfast   will discuss further physiologic stress test vs. cardiac cath needed with attending Assessment:  Chest pain, ACS ruled out  Risk factors: DM II, FH (+) premature CAD, DLD, (+) Smoking    Plan:  telemetry  obtain lipid profile and [id number]  c/w diabetes management  risk factor modification  keep NPO after light breakfast   on statin at home, resume statin  on schedule for cardiac cath later today

## 2021-02-23 NOTE — H&P ADULT - NSICDXFAMILYHX_GEN_ALL_CORE_FT
FAMILY HISTORY:  FH: CABG (coronary artery bypass surgery), at 50 father  History of type 1 diabetes mellitus in mother

## 2021-02-23 NOTE — H&P ADULT - HISTORY OF PRESENT ILLNESS
41 year old male with hx of type 2 DM and FH of premature CAD presents with chest pain and palpitations of 3 days duration.  Patient after an argument had severe chest pain described as chest pressure substernal occurred on Saturday night lasted for 10 min as severe and then decreased to begin a faint chest  pressure persistent since then. He states that the chest pressure got worse at work where he does physical activity on monday and thast why he presented to ED. Symptoms are associated with occasional palpitations and shortness of breath and patient admits to an element of anxiety   2 ecg in ED showed incomplete RBB with no acute ischemic signs, troponins 2 sets were negative   CCTA done showed mod narrowing vs artefact in mid-segment of LAD   patient admitted for cardiology evaluation and monitoring with need for further work up with probably cardiac catheterization

## 2021-02-23 NOTE — ED ADULT NURSE REASSESSMENT NOTE - NS ED NURSE REASSESS COMMENT FT1
Pt is calm, alert x oriented x4, c/o feeling pressure to his chest area at 8;00pm, like something is sitting on it. Pt was given Toradol and  tylenols. he ws able to eat dinner, is connected to the monitor, stable vital signs , safety precautions in place, safety in place.
Pt was able to rest well, but is still c/o pains in his chest. Pt is scheduled for CT angio this morning. Safety precautions in place, to continue monitoring. Pt was given Metoprolol 100mg PO.
Pt is alert and orientedx4, c/o chest pains, that feels like pressure and was given Toradol IV PUSH, pt was able to sleep on and off, safety precautions in place.

## 2021-02-24 ENCOUNTER — TRANSCRIPTION ENCOUNTER (OUTPATIENT)
Age: 41
End: 2021-02-24

## 2021-02-24 LAB
A1C WITH ESTIMATED AVERAGE GLUCOSE RESULT: 8.4 % — HIGH (ref 4–5.6)
ALBUMIN SERPL ELPH-MCNC: 4 G/DL — SIGNIFICANT CHANGE UP (ref 3.5–5.2)
ALP SERPL-CCNC: 70 U/L — SIGNIFICANT CHANGE UP (ref 30–115)
ALT FLD-CCNC: 31 U/L — SIGNIFICANT CHANGE UP (ref 0–41)
ANION GAP SERPL CALC-SCNC: 14 MMOL/L — SIGNIFICANT CHANGE UP (ref 7–14)
AST SERPL-CCNC: 20 U/L — SIGNIFICANT CHANGE UP (ref 0–41)
BASOPHILS # BLD AUTO: 0.03 K/UL — SIGNIFICANT CHANGE UP (ref 0–0.2)
BASOPHILS NFR BLD AUTO: 0.5 % — SIGNIFICANT CHANGE UP (ref 0–1)
BILIRUB SERPL-MCNC: <0.2 MG/DL — SIGNIFICANT CHANGE UP (ref 0.2–1.2)
BUN SERPL-MCNC: 19 MG/DL — SIGNIFICANT CHANGE UP (ref 10–20)
CALCIUM SERPL-MCNC: 9.5 MG/DL — SIGNIFICANT CHANGE UP (ref 8.5–10.1)
CHLORIDE SERPL-SCNC: 99 MMOL/L — SIGNIFICANT CHANGE UP (ref 98–110)
CO2 SERPL-SCNC: 25 MMOL/L — SIGNIFICANT CHANGE UP (ref 17–32)
CREAT SERPL-MCNC: 0.9 MG/DL — SIGNIFICANT CHANGE UP (ref 0.7–1.5)
EOSINOPHIL # BLD AUTO: 0.09 K/UL — SIGNIFICANT CHANGE UP (ref 0–0.7)
EOSINOPHIL NFR BLD AUTO: 1.4 % — SIGNIFICANT CHANGE UP (ref 0–8)
ESTIMATED AVERAGE GLUCOSE: 194 MG/DL — HIGH (ref 68–114)
GLUCOSE BLDC GLUCOMTR-MCNC: 127 MG/DL — HIGH (ref 70–99)
GLUCOSE BLDC GLUCOMTR-MCNC: 175 MG/DL — HIGH (ref 70–99)
GLUCOSE BLDC GLUCOMTR-MCNC: 360 MG/DL — HIGH (ref 70–99)
GLUCOSE BLDC GLUCOMTR-MCNC: 77 MG/DL — SIGNIFICANT CHANGE UP (ref 70–99)
GLUCOSE SERPL-MCNC: 203 MG/DL — HIGH (ref 70–99)
HCT VFR BLD CALC: 43.5 % — SIGNIFICANT CHANGE UP (ref 42–52)
HGB BLD-MCNC: 14.3 G/DL — SIGNIFICANT CHANGE UP (ref 14–18)
IMM GRANULOCYTES NFR BLD AUTO: 0.3 % — SIGNIFICANT CHANGE UP (ref 0.1–0.3)
LYMPHOCYTES # BLD AUTO: 2.39 K/UL — SIGNIFICANT CHANGE UP (ref 1.2–3.4)
LYMPHOCYTES # BLD AUTO: 38.1 % — SIGNIFICANT CHANGE UP (ref 20.5–51.1)
MAGNESIUM SERPL-MCNC: 2 MG/DL — SIGNIFICANT CHANGE UP (ref 1.8–2.4)
MCHC RBC-ENTMCNC: 29.2 PG — SIGNIFICANT CHANGE UP (ref 27–31)
MCHC RBC-ENTMCNC: 32.9 G/DL — SIGNIFICANT CHANGE UP (ref 32–37)
MCV RBC AUTO: 89 FL — SIGNIFICANT CHANGE UP (ref 80–94)
MONOCYTES # BLD AUTO: 0.61 K/UL — HIGH (ref 0.1–0.6)
MONOCYTES NFR BLD AUTO: 9.7 % — HIGH (ref 1.7–9.3)
NEUTROPHILS # BLD AUTO: 3.13 K/UL — SIGNIFICANT CHANGE UP (ref 1.4–6.5)
NEUTROPHILS NFR BLD AUTO: 50 % — SIGNIFICANT CHANGE UP (ref 42.2–75.2)
NRBC # BLD: 0 /100 WBCS — SIGNIFICANT CHANGE UP (ref 0–0)
PLATELET # BLD AUTO: 255 K/UL — SIGNIFICANT CHANGE UP (ref 130–400)
POTASSIUM SERPL-MCNC: 4.4 MMOL/L — SIGNIFICANT CHANGE UP (ref 3.5–5)
POTASSIUM SERPL-SCNC: 4.4 MMOL/L — SIGNIFICANT CHANGE UP (ref 3.5–5)
PROT SERPL-MCNC: 6.5 G/DL — SIGNIFICANT CHANGE UP (ref 6–8)
RBC # BLD: 4.89 M/UL — SIGNIFICANT CHANGE UP (ref 4.7–6.1)
RBC # FLD: 13.1 % — SIGNIFICANT CHANGE UP (ref 11.5–14.5)
SODIUM SERPL-SCNC: 138 MMOL/L — SIGNIFICANT CHANGE UP (ref 135–146)
WBC # BLD: 6.27 K/UL — SIGNIFICANT CHANGE UP (ref 4.8–10.8)
WBC # FLD AUTO: 6.27 K/UL — SIGNIFICANT CHANGE UP (ref 4.8–10.8)

## 2021-02-24 PROCEDURE — 93306 TTE W/DOPPLER COMPLETE: CPT | Mod: 26

## 2021-02-24 PROCEDURE — 99233 SBSQ HOSP IP/OBS HIGH 50: CPT

## 2021-02-24 RX ORDER — PANTOPRAZOLE SODIUM 20 MG/1
1 TABLET, DELAYED RELEASE ORAL
Qty: 0 | Refills: 0 | DISCHARGE
Start: 2021-02-24

## 2021-02-24 RX ORDER — DEXTROSE 50 % IN WATER 50 %
50 SYRINGE (ML) INTRAVENOUS ONCE
Refills: 0 | Status: COMPLETED | OUTPATIENT
Start: 2021-02-24 | End: 2021-02-24

## 2021-02-24 RX ORDER — SODIUM CHLORIDE 9 MG/ML
1000 INJECTION INTRAMUSCULAR; INTRAVENOUS; SUBCUTANEOUS
Refills: 0 | Status: DISCONTINUED | OUTPATIENT
Start: 2021-02-24 | End: 2021-02-25

## 2021-02-24 RX ORDER — NITROGLYCERIN 6.5 MG
0.4 CAPSULE, EXTENDED RELEASE ORAL ONCE
Refills: 0 | Status: COMPLETED | OUTPATIENT
Start: 2021-02-24 | End: 2021-02-24

## 2021-02-24 RX ORDER — SODIUM CHLORIDE 9 MG/ML
1000 INJECTION INTRAMUSCULAR; INTRAVENOUS; SUBCUTANEOUS
Refills: 0 | Status: DISCONTINUED | OUTPATIENT
Start: 2021-02-24 | End: 2021-02-24

## 2021-02-24 RX ADMIN — Medication 25 MILLIGRAM(S): at 06:10

## 2021-02-24 RX ADMIN — ATORVASTATIN CALCIUM 40 MILLIGRAM(S): 80 TABLET, FILM COATED ORAL at 22:01

## 2021-02-24 RX ADMIN — Medication 50 MILLILITER(S): at 13:22

## 2021-02-24 RX ADMIN — Medication 0.4 MILLIGRAM(S): at 15:38

## 2021-02-24 RX ADMIN — Medication 5: at 09:47

## 2021-02-24 RX ADMIN — Medication 81 MILLIGRAM(S): at 12:03

## 2021-02-24 RX ADMIN — Medication 25 MILLIGRAM(S): at 17:16

## 2021-02-24 RX ADMIN — SODIUM CHLORIDE 75 MILLILITER(S): 9 INJECTION INTRAMUSCULAR; INTRAVENOUS; SUBCUTANEOUS at 13:22

## 2021-02-24 RX ADMIN — Medication 3 UNIT(S): at 09:47

## 2021-02-24 NOTE — PROGRESS NOTE ADULT - ASSESSMENT
41 year old male with hx of DM2 and fam hx of premature CAD presents with chest pain and palpitations for 3 days likely secondary to unstable angina, CCTA showing moderate narrowing of mid-segment LAD    # Unstable angina  - not having chest pain now at rest  - started on aspirin 81 / atorvastatin 40 qd / lopressor 25 bid  - on schedule for cath today    # DM2  - takes pioglitazone 30 / semaglutide 7 at home  - c/w insulin here    PLAN: f/u cath today    # DVT PPX: lovenox  # GI PPX: protonix 40  # Diet: NPO for now  FULL CODE

## 2021-02-24 NOTE — DISCHARGE NOTE PROVIDER - NSDCCPCAREPLAN_GEN_ALL_CORE_FT
PRINCIPAL DISCHARGE DIAGNOSIS  Diagnosis: Chest pain  Assessment and Plan of Treatment: During your course here you had a cardiac CT which was positive and then you had a cath done.  Please continue on the medications prescibed and follow up with Dr. Koenig.  Please also follow up with Dr. Carrasco for better control of your diabetes.       PRINCIPAL DISCHARGE DIAGNOSIS  Diagnosis: Chest pain  Assessment and Plan of Treatment: During your course here you had a cardiac CT which was positive and then you had a cath donethast showed mild disease but no acute coronary diasease.  Please continue on the medications prescibed and follow up with Dr. Koenig.  Please also follow up with Dr. Carrasco for better control of your diabetes.

## 2021-02-24 NOTE — DISCHARGE NOTE PROVIDER - NSDCMRMEDTOKEN_GEN_ALL_CORE_FT
pioglitazone 30 mg oral tablet: 1 tab(s) orally once a day  semaglutide 7 mg oral tablet: 1 tab(s) orally once a day   pantoprazole 40 mg oral delayed release tablet: 1 tab(s) orally once a day (before a meal)  pioglitazone 30 mg oral tablet: 1 tab(s) orally once a day  semaglutide 7 mg oral tablet: 1 tab(s) orally once a day   atorvastatin 40 mg oral tablet: 1 tab(s) orally once a day (at bedtime)  pantoprazole 40 mg oral delayed release tablet: 1 tab(s) orally once a day (before a meal)  pioglitazone 30 mg oral tablet: 1 tab(s) orally once a day  semaglutide 7 mg oral tablet: 1 tab(s) orally once a day

## 2021-02-24 NOTE — DISCHARGE NOTE PROVIDER - PROVIDER TOKENS
PROVIDER:[TOKEN:[[id number]],FOLLOWUP:[1-3 days]],PROVIDER:[TOKEN:[[id number]],FOLLOWUP:[1 week]]

## 2021-02-24 NOTE — DISCHARGE NOTE PROVIDER - HOSPITAL COURSE
41 year old male with hx of type 2 DM and FH of premature CAD presents with chest pain and palpitations of 3 days duration.  Patient after an argument had severe chest pain described as chest pressure substernal occurred on Saturday night lasted for 10 min as severe and then decreased to begin a faint chest  pressure persistent since then. He states that the chest pressure got worse at work where he does physical activity on monday and thast why he presented to ED. Symptoms are associated with occasional palpitations and shortness of breath and patient admits to an element of anxiety   2 ecg in ED showed incomplete RBB with no acute ischemic signs, troponins 2 sets were negative   CCTA done showed mod narrowing vs artefact in mid-segment of LAD   patient admitted for cardiology evaluation and monitoring with need for further work up with probably cardiac catheterization     During his course here he had a positive CCTA.  He will be going for cardiac cath. 41 year old male with hx of type 2 DM and FH of premature CAD presents with chest pain and palpitations of 3 days duration.  Patient after an argument had severe chest pain described as chest pressure substernal occurred on Saturday night lasted for 10 min as severe and then decreased to begin a faint chest  pressure persistent since then. He states that the chest pressure got worse at work where he does physical activity on monday and thast why he presented to ED. Symptoms are associated with occasional palpitations and shortness of breath and patient admits to an element of anxiety   2 ecg in ED showed incomplete RBB with no acute ischemic signs, troponins 2 sets were negative   CCTA done showed mod narrowing vs artefact in mid-segment of LAD   cath done showed non obstructive disease - being discharged with risk factror modification

## 2021-02-24 NOTE — PROGRESS NOTE ADULT - ATTENDING COMMENTS
Chest pain, typical, rule out ACS. Admit to tele. Cardio is following. Serial cardiac enzymes and EKGs. A1c, Lipids, and TSH. ASA given in the ED. Echo. PRN pain meds. Restart home meds.  -Current cardio note does not mention possible CATH in the AM. Will await further recs.     Diabetes mellitus with hyperglycemia. A1c. FSs. Insulin PRN.     Restart home meds, except as stated above. DVT PPX. Inform PCP of pt's admission to hospital. My note supersedes the residents note.       Chest pain, ACS ruled out  Risk factors: DM II, FH (+) premature CAD, DLD, (+) Smoking    Plan:  telemetry  obtain lipid profile and [id number]  c/w diabetes management  risk factor modification  keep NPO after light breakfast   on statin at home, resume statin  on schedule for cardiac cath later today Patient seen and examined, endorses persistent midsternal chest pressure.     #Chest Pain: awaiting Cardiac catheterization today, is on add-on, Cardiology recommendations noted, f/u HgbA1C%, lipid panel, continue ASA, high intensity statin, lopressor    #DM with hyperglycemia: monitor fingersticks, basal bolus insulin     Disposition: Cardiac Catheterization today, remains acute Patient seen and examined, endorses persistent midsternal chest pressure.     #Chest Pain, Abnormal CCTA: awaiting Cardiac catheterization today, is on add-on, Cardiology recommendations noted, f/u HgbA1C%, lipid panel, continue ASA, high intensity statin, lopressor    #DM with hyperglycemia: monitor fingersticks, basal bolus insulin     Disposition: Cardiac Catheterization today, remains acute

## 2021-02-24 NOTE — PROGRESS NOTE ADULT - SUBJECTIVE AND OBJECTIVE BOX
Admit Date: 21 (1d)    Chief Complaint:  Patient is a 41y old  Male who presents with a chief complaint of chest pain (2021 18:44)      Past Medical and Surgical History:  PAST MEDICAL & SURGICAL HISTORY:  Diabetes    S/P cervical spinal fusion    H/O wrist surgery    H/O fracture of tibia          Current Medications:  MEDICATIONS  (STANDING):  aspirin  chewable 81 milliGRAM(s) Oral daily  atorvastatin 40 milliGRAM(s) Oral at bedtime  dextrose 40% Gel 15 Gram(s) Oral once  dextrose 5%. 1000 milliLiter(s) (50 mL/Hr) IV Continuous <Continuous>  dextrose 5%. 1000 milliLiter(s) (100 mL/Hr) IV Continuous <Continuous>  dextrose 50% Injectable 25 Gram(s) IV Push once  dextrose 50% Injectable 12.5 Gram(s) IV Push once  dextrose 50% Injectable 25 Gram(s) IV Push once  enoxaparin Injectable 40 milliGRAM(s) SubCutaneous daily  glucagon  Injectable 1 milliGRAM(s) IntraMuscular once  insulin glargine Injectable (LANTUS) 9 Unit(s) SubCutaneous every morning  insulin lispro (ADMELOG) corrective regimen sliding scale   SubCutaneous three times a day before meals  insulin lispro Injectable (ADMELOG) 3 Unit(s) SubCutaneous before breakfast  insulin lispro Injectable (ADMELOG) 3 Unit(s) SubCutaneous before lunch  insulin lispro Injectable (ADMELOG) 3 Unit(s) SubCutaneous before dinner  metoprolol tartrate 25 milliGRAM(s) Oral two times a day  pantoprazole    Tablet 40 milliGRAM(s) Oral before breakfast    MEDICATIONS  (PRN):      Vital Signs:  T(F): 97 (21 @ 07:55), Max: 98 (21 @ 15:26)  HR: 76 (21 @ 07:55) (66 - 94)  BP: 116/69 (21 @ 07:55) (100/64 - 142/96)  RR: 18 (21 @ 07:55) (18 - 18)  SpO2: 98% (21 @ 07:55) (97% - 99%)  CAPILLARY BLOOD GLUCOSE      POCT Blood Glucose.: 360 mg/dL (2021 09:38)  POCT Blood Glucose.: 265 mg/dL (2021 22:48)  POCT Blood Glucose.: 150 mg/dL (2021 11:55)      Physical Exam:  General: Not in distress.   HEENT: Moist mucus membranes. PERRLA.  Cardio: Regular rate and rhythm, S1, S2, no murmur, rub, or gallop.  Pulm: Clear to auscultation bilaterally. No wheezing, rales, or rhonchi.  Abdomen: Soft, non-tender, non-distended. Normoactive bowel sounds.  Extremities: No cyanosis or edema bilaterally. No calf tenderness to palpation.  Neuro: A&O x3.     Labs and Imaging:  CBC Full  -  ( 2021 04:30 )  WBC Count : 6.27 K/uL  RBC Count : 4.89 M/uL  Hemoglobin : 14.3 g/dL  Hematocrit : 43.5 %  Platelet Count - Automated : 255 K/uL  Mean Cell Volume : 89.0 fL  Mean Cell Hemoglobin : 29.2 pg  Mean Cell Hemoglobin Concentration : 32.9 g/dL  Auto Neutrophil # : 3.13 K/uL  Auto Lymphocyte # : 2.39 K/uL  Auto Monocyte # : 0.61 K/uL  Auto Eosinophil # : 0.09 K/uL  Auto Basophil # : 0.03 K/uL  Auto Neutrophil % : 50.0 %  Auto Lymphocyte % : 38.1 %  Auto Monocyte % : 9.7 %  Auto Eosinophil % : 1.4 %  Auto Basophil % : 0.5 %    RDW: 13.1      BMP: 21 @ 04:30  138 | 99 | 19   -----------------< 203  4.4  | 25 | 0.9  eGFR(AA): 123, eGFR (non-AA): 106  Ca 9.5, Mg 2.0, P --    LFTs: 21 @ 04:30  TP  6.5  | 4.0 Alb   ---------------  TB  <0.2  | --  DB   ---------------  ALT 31  | 20  AST            ^          70  ALK  LFTs: 21 @ 16:50  TP  7.0  | 4.5 Alb   ---------------  TB  0.4  | --  DB   ---------------  ALT 32  | 24  AST            ^          75  ALK      LFTs: 21 @ 04:30  Ca  9.5  | 20 AST   -----------------  TP  6.5  | 31 ALT  -----------------  Alb 4.0  | 70 ALK          ^        <0.2         TB      Cardiac Enzymes:    Urinalysis:  Urinalysis Basic - ( 2021 13:20 )    Color: Light Yellow / Appearance: Clear / S.023 / pH: x  Gluc: x / Ketone: Negative  / Bili: Negative / Urobili: <2 mg/dL   Blood: x / Protein: Negative / Nitrite: Negative   Leuk Esterase: Negative / RBC: x / WBC x   Sq Epi: x / Non Sq Epi: x / Bacteria: x      Cultures:      Home Medications:  Home Medications:  pioglitazone 30 mg oral tablet: 1 tab(s) orally once a day (2021 18:14)  semaglutide 7 mg oral tablet: 1 tab(s) orally once a day (2021 18:14)

## 2021-02-24 NOTE — PROGRESS NOTE ADULT - SUBJECTIVE AND OBJECTIVE BOX
SUBJECTIVE:    Patient is a 41y old Male who presents with a chief complaint of chest pain (2021 18:44)      HPI:  41 year old male with hx of type 2 DM and FH of premature CAD presents with chest pain and palpitations of 3 days duration.  Patient after an argument had severe chest pain described as chest pressure substernal occurred on Saturday night lasted for 10 min as severe and then decreased to begin a faint chest  pressure persistent since then. He states that the chest pressure got worse at work where he does physical activity on monday and  why he presented to ED. Symptoms are associated with occasional palpitations and shortness of breath and patient admits to an element of anxiety   2 ecg in ED showed incomplete RBB with no acute ischemic signs, troponins 2 sets were negative   CCTA done showed mod narrowing vs artefact in mid-segment of LAD   patient admitted for cardiology evaluation and monitoring with need for further work up with probably cardiac catheterization  (2021 17:52)      Currently admitted to medicine with the primary diagnosis of Chest pain    no chest pain right now     Besides the pertinent positives and negatives described above, the ROS was within normal limits.    PAST MEDICAL & SURGICAL HISTORY  Diabetes    S/P cervical spinal fusion    H/O wrist surgery    H/O fracture of tibia        SOCIAL HISTORY:    ALLERGIES:  No Known Allergies    MEDICATIONS:  STANDING MEDICATIONS  aspirin  chewable 81 milliGRAM(s) Oral daily  atorvastatin 40 milliGRAM(s) Oral at bedtime  dextrose 40% Gel 15 Gram(s) Oral once  dextrose 5%. 1000 milliLiter(s) IV Continuous <Continuous>  dextrose 5%. 1000 milliLiter(s) IV Continuous <Continuous>  dextrose 50% Injectable 25 Gram(s) IV Push once  dextrose 50% Injectable 12.5 Gram(s) IV Push once  dextrose 50% Injectable 25 Gram(s) IV Push once  enoxaparin Injectable 40 milliGRAM(s) SubCutaneous daily  glucagon  Injectable 1 milliGRAM(s) IntraMuscular once  insulin glargine Injectable (LANTUS) 9 Unit(s) SubCutaneous every morning  insulin lispro (ADMELOG) corrective regimen sliding scale   SubCutaneous three times a day before meals  insulin lispro Injectable (ADMELOG) 3 Unit(s) SubCutaneous before breakfast  insulin lispro Injectable (ADMELOG) 3 Unit(s) SubCutaneous before lunch  insulin lispro Injectable (ADMELOG) 3 Unit(s) SubCutaneous before dinner  metoprolol tartrate 25 milliGRAM(s) Oral two times a day  pantoprazole    Tablet 40 milliGRAM(s) Oral before breakfast    PRN MEDICATIONS    VITALS:   T(F): 97  HR: 76  BP: 116/69  RR: 18  SpO2: 98%    LABS:                        14.3   6.27  )-----------( 255      ( 2021 04:30 )             43.5         138  |  99  |  19  ----------------------------<  203<H>  4.4   |  25  |  0.9    Ca    9.5      2021 04:30  Mg     2.0         TPro  6.5  /  Alb  4.0  /  TBili  <0.2  /  DBili  x   /  AST  20  /  ALT  31  /  AlkPhos  70        Urinalysis Basic - ( 2021 13:20 )    Color: Light Yellow / Appearance: Clear / S.023 / pH: x  Gluc: x / Ketone: Negative  / Bili: Negative / Urobili: <2 mg/dL   Blood: x / Protein: Negative / Nitrite: Negative   Leuk Esterase: Negative / RBC: x / WBC x   Sq Epi: x / Non Sq Epi: x / Bacteria: x        Troponin T, Serum: <0.01 ng/mL (21 @ 21:24)      CARDIAC MARKERS ( 2021 21:24 )  x     / <0.01 ng/mL / x     / x     / x      CARDIAC MARKERS ( 2021 21:03 )  x     / <0.01 ng/mL / x     / x     / x      CARDIAC MARKERS ( 2021 16:50 )  x     / <0.01 ng/mL / x     / x     / x          RADIOLOGY:    PHYSICAL EXAM:  GEN: No acute distress  LUNGS: Clear to auscultation bilaterally   HEART: Regular  ABD: Soft, non-tender, non-distended.  EXT: NC/NC/NE/2+PP/BOOTH/Skin Intact.   NEURO: AAOX3    Intravenous access: yes  NG tube: no  Bruno Catheter: no

## 2021-02-24 NOTE — DISCHARGE NOTE PROVIDER - CARE PROVIDERS DIRECT ADDRESSES
,DirectAddress_Unknown,maria de [email or web address]

## 2021-02-24 NOTE — CHART NOTE - NSCHARTNOTEFT_GEN_A_CORE
Preliminary Cardiac Catheterization Post-Procedure Report    PRE-OP DIAGNOSIS: chest pain. abnormal CCTA.     PROCEDURE: Coronary angiogram, [facility]    Attending: Dr. Koenig   Fellow: Dr. Gao    ANESTHESIA TYPE  [  ]General Anesthesia  [ x ] Sedation  [  x] Local/Regional    ESTIMATED BLOOD LOSS:   < 10 mL    CONDITION  [  ] Critical  [  ] Serious  [  ]Fair  [ x ]Good    ACCESS & HEMOSTASIS  [ x ] Right radial  ->D stat  [  ] Right femoral  [  ] Left radial  [  ] Left femoral       FINDINGS    Hemodynamics: Hemodynamic assessment demonstrates normal LVEDP.   Ventricles: EF calculated by contrast ventriculography was 65 %.   Coronary circulation: The coronary circulation is right dominant. There was no angiographic evidence for occlusive coronary artery disease. Left main: Angiography showed no evidence of disease. LAD: The vessel was medium to large sized. The artery bifurcated into two medium sized vessels in the proximal segment (dual LAD system). There was a discrete 20% stenosis at the bifurcation. No disease in mid and distal LAD. Circumflex: The vessel was large sized. Angiography showed no evidence of disease. 1st obtuse marginal: Angiography showed no evidence of disease. RCA: The vessel was large sized (dominant). Angiography showed minor luminal irregularities with no flow limiting lesions. Right PDA: Angiography showed no evidence of disease. Right posterolateral segment: Angiography showed no evidence of disease.     PROCEDURE SUMMARY  Mild non-obstructive coronary artery disease.      RECOMMENDATIONS  -IV hydration post procedure   -Aggressive medical therapy and risk factor modification  -Out patient follow up with cardiology

## 2021-02-24 NOTE — DISCHARGE NOTE PROVIDER - CARE PROVIDER_API CALL
Juwan Koenig)  Cardiovascular Disease; Interventional Cardiology  [address] [place] [address] [address]  [place] [address]  Phone: [phone]  Fax: [phone]  Follow Up Time: 1-3 days    Jc Carrasco  INTERNAL MEDICINE  [address] [place] [address]  Phone: [phone]  Fax: [phone]  Follow Up Time: 1 week

## 2021-02-25 ENCOUNTER — TRANSCRIPTION ENCOUNTER (OUTPATIENT)
Age: 41
End: 2021-02-25

## 2021-02-25 VITALS — OXYGEN SATURATION: 98 % | RESPIRATION RATE: 18 BRPM

## 2021-02-25 LAB
ALBUMIN SERPL ELPH-MCNC: 3.7 G/DL — SIGNIFICANT CHANGE UP (ref 3.5–5.2)
ALP SERPL-CCNC: 65 U/L — SIGNIFICANT CHANGE UP (ref 30–115)
ALT FLD-CCNC: 32 U/L — SIGNIFICANT CHANGE UP (ref 0–41)
ANION GAP SERPL CALC-SCNC: 9 MMOL/L — SIGNIFICANT CHANGE UP (ref 7–14)
AST SERPL-CCNC: 19 U/L — SIGNIFICANT CHANGE UP (ref 0–41)
BASOPHILS # BLD AUTO: 0.03 K/UL — SIGNIFICANT CHANGE UP (ref 0–0.2)
BASOPHILS NFR BLD AUTO: 0.5 % — SIGNIFICANT CHANGE UP (ref 0–1)
BILIRUB SERPL-MCNC: 0.3 MG/DL — SIGNIFICANT CHANGE UP (ref 0.2–1.2)
BUN SERPL-MCNC: 16 MG/DL — SIGNIFICANT CHANGE UP (ref 10–20)
CALCIUM SERPL-MCNC: 9 MG/DL — SIGNIFICANT CHANGE UP (ref 8.5–10.1)
CHLORIDE SERPL-SCNC: 103 MMOL/L — SIGNIFICANT CHANGE UP (ref 98–110)
CHOLEST SERPL-MCNC: 134 MG/DL — SIGNIFICANT CHANGE UP
CO2 SERPL-SCNC: 25 MMOL/L — SIGNIFICANT CHANGE UP (ref 17–32)
CREAT SERPL-MCNC: 0.9 MG/DL — SIGNIFICANT CHANGE UP (ref 0.7–1.5)
EOSINOPHIL # BLD AUTO: 0.08 K/UL — SIGNIFICANT CHANGE UP (ref 0–0.7)
EOSINOPHIL NFR BLD AUTO: 1.4 % — SIGNIFICANT CHANGE UP (ref 0–8)
GLUCOSE BLDC GLUCOMTR-MCNC: 164 MG/DL — HIGH (ref 70–99)
GLUCOSE BLDC GLUCOMTR-MCNC: 193 MG/DL — HIGH (ref 70–99)
GLUCOSE BLDC GLUCOMTR-MCNC: 214 MG/DL — HIGH (ref 70–99)
GLUCOSE SERPL-MCNC: 182 MG/DL — HIGH (ref 70–99)
HCT VFR BLD CALC: 42.4 % — SIGNIFICANT CHANGE UP (ref 42–52)
HDLC SERPL-MCNC: 31 MG/DL — LOW
HGB BLD-MCNC: 13.9 G/DL — LOW (ref 14–18)
IMM GRANULOCYTES NFR BLD AUTO: 0.4 % — HIGH (ref 0.1–0.3)
LIPID PNL WITH DIRECT LDL SERPL: 62 MG/DL — SIGNIFICANT CHANGE UP
LYMPHOCYTES # BLD AUTO: 2.05 K/UL — SIGNIFICANT CHANGE UP (ref 1.2–3.4)
LYMPHOCYTES # BLD AUTO: 36.2 % — SIGNIFICANT CHANGE UP (ref 20.5–51.1)
MAGNESIUM SERPL-MCNC: 2.1 MG/DL — SIGNIFICANT CHANGE UP (ref 1.8–2.4)
MCHC RBC-ENTMCNC: 29.5 PG — SIGNIFICANT CHANGE UP (ref 27–31)
MCHC RBC-ENTMCNC: 32.8 G/DL — SIGNIFICANT CHANGE UP (ref 32–37)
MCV RBC AUTO: 90 FL — SIGNIFICANT CHANGE UP (ref 80–94)
MONOCYTES # BLD AUTO: 0.5 K/UL — SIGNIFICANT CHANGE UP (ref 0.1–0.6)
MONOCYTES NFR BLD AUTO: 8.8 % — SIGNIFICANT CHANGE UP (ref 1.7–9.3)
NEUTROPHILS # BLD AUTO: 2.98 K/UL — SIGNIFICANT CHANGE UP (ref 1.4–6.5)
NEUTROPHILS NFR BLD AUTO: 52.7 % — SIGNIFICANT CHANGE UP (ref 42.2–75.2)
NON HDL CHOLESTEROL: 103 MG/DL — SIGNIFICANT CHANGE UP
NRBC # BLD: 0 /100 WBCS — SIGNIFICANT CHANGE UP (ref 0–0)
PLATELET # BLD AUTO: 254 K/UL — SIGNIFICANT CHANGE UP (ref 130–400)
POTASSIUM SERPL-MCNC: 4.5 MMOL/L — SIGNIFICANT CHANGE UP (ref 3.5–5)
POTASSIUM SERPL-SCNC: 4.5 MMOL/L — SIGNIFICANT CHANGE UP (ref 3.5–5)
PROT SERPL-MCNC: 6.2 G/DL — SIGNIFICANT CHANGE UP (ref 6–8)
RBC # BLD: 4.71 M/UL — SIGNIFICANT CHANGE UP (ref 4.7–6.1)
RBC # FLD: 12.8 % — SIGNIFICANT CHANGE UP (ref 11.5–14.5)
SODIUM SERPL-SCNC: 137 MMOL/L — SIGNIFICANT CHANGE UP (ref 135–146)
TRIGL SERPL-MCNC: 260 MG/DL — HIGH
WBC # BLD: 5.66 K/UL — SIGNIFICANT CHANGE UP (ref 4.8–10.8)
WBC # FLD AUTO: 5.66 K/UL — SIGNIFICANT CHANGE UP (ref 4.8–10.8)

## 2021-02-25 PROCEDURE — 99239 HOSP IP/OBS DSCHRG MGMT >30: CPT

## 2021-02-25 RX ORDER — ATORVASTATIN CALCIUM 80 MG/1
1 TABLET, FILM COATED ORAL
Qty: 0 | Refills: 0 | DISCHARGE
Start: 2021-02-25

## 2021-02-25 RX ORDER — ATORVASTATIN CALCIUM 80 MG/1
1 TABLET, FILM COATED ORAL
Qty: 30 | Refills: 3
Start: 2021-02-25 | End: 2021-06-24

## 2021-02-25 RX ORDER — BENZOCAINE 10 %
1 GEL (GRAM) MUCOUS MEMBRANE ONCE
Refills: 0 | Status: DISCONTINUED | OUTPATIENT
Start: 2021-02-25 | End: 2021-02-25

## 2021-02-25 RX ORDER — LANOLIN ALCOHOL/MO/W.PET/CERES
5 CREAM (GRAM) TOPICAL ONCE
Refills: 0 | Status: COMPLETED | OUTPATIENT
Start: 2021-02-25 | End: 2021-02-25

## 2021-02-25 RX ADMIN — PANTOPRAZOLE SODIUM 40 MILLIGRAM(S): 20 TABLET, DELAYED RELEASE ORAL at 05:50

## 2021-02-25 RX ADMIN — Medication 25 MILLIGRAM(S): at 05:50

## 2021-02-25 RX ADMIN — INSULIN GLARGINE 9 UNIT(S): 100 INJECTION, SOLUTION SUBCUTANEOUS at 07:32

## 2021-02-25 RX ADMIN — Medication 1: at 07:30

## 2021-02-25 RX ADMIN — Medication 3 UNIT(S): at 07:30

## 2021-02-25 NOTE — PROGRESS NOTE ADULT - SUBJECTIVE AND OBJECTIVE BOX
RUPA SANTOS  41y Male    CHIEF COMPLAINT:    Patient is a 41y old  Male who presents with a chief complaint of chest pain (2021 15:33)      INTERVAL HPI/OVERNIGHT EVENTS:    Patient seen and examined.    ROS: All other systems are negative.    Vital Signs:    T(F): 97.8 (21 @ 05:00), Max: 98.6 (21 @ 16:36)  HR: 69 (21 @ 05:00) (69 - 79)  BP: 112/71 (21 @ 05:00) (98/57 - 127/61)  RR: 18 (21 @ 05:00) (18 - 20)  SpO2: 99% (21 @ 06:32) (97% - 99%)  I&O's Summary    2021 07:01  -  2021 07:00  --------------------------------------------------------  IN: 560 mL / OUT: 325 mL / NET: 235 mL      Daily Height in cm: 165.1 (2021 21:33)    Daily Weight in k (2021 05:00)  CAPILLARY BLOOD GLUCOSE      POCT Blood Glucose.: 214 mg/dL (2021 05:02)  POCT Blood Glucose.: 175 mg/dL (2021 21:42)  POCT Blood Glucose.: 127 mg/dL (2021 16:57)  POCT Blood Glucose.: 77 mg/dL (2021 13:04)  POCT Blood Glucose.: 360 mg/dL (2021 09:38)      PHYSICAL EXAM:    GENERAL:  NAD  SKIN: No rashes or lesions  HENT: Atrumatic. Normocephalic. PERRL. Moist membranes.  NECK: Supple, No JVD. No lymphadenopathy.  PULMONARY: CTA B/L. No wheezing. No rales  CVS: Normal S1, S2. Rate and Rythm are regular. No murmurs.  ABDOMEN/GI: Soft, Nontender, Nondistended; BS present  EXTREMITIES: Peripheral pulses intact. No edema B/L LE.  NEUROLOGIC:  No motor or sensory deficit.  PSYCH: Alert & oriented x 3    Consultant(s) Notes Reviewed:  [x ] YES  [ ] NO  Care Discussed with Consultants/Other Providers [ x] YES  [ ] NO    EKG reviewed  Telemetry reviewed    LABS:                        14.3   6.27  )-----------( 255      ( 2021 04:30 )             43.5         138  |  99  |  19  ----------------------------<  203<H>  4.4   |  25  |  0.9    Ca    9.5      2021 04:30  Mg     2.0         TPro  6.5  /  Alb  4.0  /  TBili  <0.2  /  DBili  x   /  AST  20  /  ALT  31  /  AlkPhos  70        Serum Pro-Brain Natriuretic Peptide: 30 pg/mL (21 @ 16:50)    Trop <0.01, CKMB --, CK --, 21 @ 21:24  Trop <0.01, CKMB --, CK --, 21 @ 21:03  Trop <0.01, CKMB --, CK --, 21 @ 16:50        RADIOLOGY & ADDITIONAL TESTS:      Imaging or report Personally Reviewed:  [ ] YES  [ ] NO    Medications:  Standing  aspirin  chewable 81 milliGRAM(s) Oral daily  atorvastatin 40 milliGRAM(s) Oral at bedtime  dextrose 40% Gel 15 Gram(s) Oral once  dextrose 5%. 1000 milliLiter(s) IV Continuous <Continuous>  dextrose 5%. 1000 milliLiter(s) IV Continuous <Continuous>  dextrose 50% Injectable 25 Gram(s) IV Push once  dextrose 50% Injectable 12.5 Gram(s) IV Push once  dextrose 50% Injectable 25 Gram(s) IV Push once  enoxaparin Injectable 40 milliGRAM(s) SubCutaneous daily  glucagon  Injectable 1 milliGRAM(s) IntraMuscular once  insulin glargine Injectable (LANTUS) 9 Unit(s) SubCutaneous every morning  insulin lispro (ADMELOG) corrective regimen sliding scale   SubCutaneous three times a day before meals  insulin lispro Injectable (ADMELOG) 3 Unit(s) SubCutaneous before breakfast  insulin lispro Injectable (ADMELOG) 3 Unit(s) SubCutaneous before lunch  insulin lispro Injectable (ADMELOG) 3 Unit(s) SubCutaneous before dinner  metoprolol tartrate 25 milliGRAM(s) Oral two times a day  pantoprazole    Tablet 40 milliGRAM(s) Oral before breakfast  sodium chloride 0.9%. 1000 milliLiter(s) IV Continuous <Continuous>    PRN Meds      Case discussed with resident    Care discussed with pt/family           RUPA SANTOS  41y Male    CHIEF COMPLAINT:    Patient is a 41y old  Male who presents with a chief complaint of chest pain (2021 15:33)      INTERVAL HPI/OVERNIGHT EVENTS:    Patient seen and examined. Pt states that he had cp when he was in argument with someone. Feels good now. No cp. No sob. No palpitations.     ROS: All other systems are negative.    Vital Signs:    T(F): 97.8 (21 @ 05:00), Max: 98.6 (21 @ 16:36)  HR: 69 (21 @ 05:00) (69 - 79)  BP: 112/71 (21 @ 05:00) (98/57 - 127/61)  RR: 18 (21 @ 05:00) (18 - 20)  SpO2: 99% (21 @ 06:32) (97% - 99%)  I&O's Summary    2021 07:01  -  2021 07:00  --------------------------------------------------------  IN: 560 mL / OUT: 325 mL / NET: 235 mL      Daily Height in cm: 165.1 (2021 21:33)    Daily Weight in k (2021 05:00)  CAPILLARY BLOOD GLUCOSE      POCT Blood Glucose.: 214 mg/dL (2021 05:02)  POCT Blood Glucose.: 175 mg/dL (2021 21:42)  POCT Blood Glucose.: 127 mg/dL (2021 16:57)  POCT Blood Glucose.: 77 mg/dL (2021 13:04)  POCT Blood Glucose.: 360 mg/dL (2021 09:38)      PHYSICAL EXAM:    GENERAL:  NAD  SKIN: No rashes or lesions  HENT: Atraumatic Normocephalic. PERRL. Moist membranes.  NECK: Supple, No JVD. No lymphadenopathy.  PULMONARY: CTA B/L. No wheezing. No rales  CVS: Normal S1, S2. Rate and Rhythm are regular. No murmurs.  ABDOMEN/GI: Soft, Nontender, Nondistended; BS present  EXTREMITIES: Peripheral pulses intact. No edema B/L LE.  NEUROLOGIC:  No motor or sensory deficit.  PSYCH: Alert & oriented x 3    Consultant(s) Notes Reviewed:  [x ] YES  [ ] NO  Care Discussed with Consultants/Other Providers [ x] YES  [ ] NO    EKG reviewed  Telemetry reviewed    LABS:                        14.3   6.27  )-----------( 255      ( 2021 04:30 )             43.5         138  |  99  |  19  ----------------------------<  203<H>  4.4   |  25  |  0.9    Ca    9.5      2021 04:30  Mg     2.0         TPro  6.5  /  Alb  4.0  /  TBili  <0.2  /  DBili  x   /  AST  20  /  ALT  31  /  AlkPhos  70        Serum Pro-Brain Natriuretic Peptide: 30 pg/mL (21 @ 16:50)    Trop <0.01, CKMB --, CK --, 21 @ 21:24  Trop <0.01, CKMB --, CK --, 21 @ 21:03  Trop <0.01, CKMB --, CK --, 21 @ 16:50        RADIOLOGY & ADDITIONAL TESTS:    < from: TTE Echo Complete w/ Contrast w/ Doppler (21 @ 11:18) >    Summary:   1. LV Ejection Fraction by Bridges's Method with a biplane EF of 62 %.   2. Normal global left ventricular systolic function.   3. Mild thickening and calcification of the anterior mitral valve leaflet.   4. No evidence of mitral valve regurgitation.   5. Normal left atrial size.   6. Normal right atrial size.    < end of copied text >    Imaging or report Personally Reviewed:  [ ] YES  [ ] NO    Medications:  Standing  aspirin  chewable 81 milliGRAM(s) Oral daily  atorvastatin 40 milliGRAM(s) Oral at bedtime  dextrose 40% Gel 15 Gram(s) Oral once  dextrose 5%. 1000 milliLiter(s) IV Continuous <Continuous>  dextrose 5%. 1000 milliLiter(s) IV Continuous <Continuous>  dextrose 50% Injectable 25 Gram(s) IV Push once  dextrose 50% Injectable 12.5 Gram(s) IV Push once  dextrose 50% Injectable 25 Gram(s) IV Push once  enoxaparin Injectable 40 milliGRAM(s) SubCutaneous daily  glucagon  Injectable 1 milliGRAM(s) IntraMuscular once  insulin glargine Injectable (LANTUS) 9 Unit(s) SubCutaneous every morning  insulin lispro (ADMELOG) corrective regimen sliding scale   SubCutaneous three times a day before meals  insulin lispro Injectable (ADMELOG) 3 Unit(s) SubCutaneous before breakfast  insulin lispro Injectable (ADMELOG) 3 Unit(s) SubCutaneous before lunch  insulin lispro Injectable (ADMELOG) 3 Unit(s) SubCutaneous before dinner  metoprolol tartrate 25 milliGRAM(s) Oral two times a day  pantoprazole    Tablet 40 milliGRAM(s) Oral before breakfast  sodium chloride 0.9%. 1000 milliLiter(s) IV Continuous <Continuous>    PRN Meds      Case discussed with resident    Care discussed with pt/family

## 2021-02-25 NOTE — PROGRESS NOTE ADULT - ASSESSMENT
41 year old male with hx of type 2 DM and FH of premature CAD presents with chest pain and palpitations of 3 days duration.      CP likely musculoskeletal  Palpitations  DM-2          PLAN:    ·	S/P cath. No o occlusion of coronaries.  41 year old male with hx of type 2 DM and FH of premature CAD presents with chest pain and palpitations of 3 days duration.      CP likely musculoskeletal  Palpitations  DM-2          PLAN:    ·	S/P cath. No o occlusion of coronaries.   ·	CE x 3 negative  ·	ECHO showed EF is 62%  ·	Trig 260 & LDL is 103. pt is diabetic. Will start him on low moderate intensity therapy, Lipitor 40 mg po qhs  ·	Cont his other home meds.   ·	D/C home    *Med rec reviewed. Plan of care D/W the pt. Time spent 33 minutes.  41 year old male with hx of type 2 DM and FH of premature CAD presents with chest pain and palpitations of 3 days duration.      CP likely due to Anxiety  Palpitations  DM-2          PLAN:    ·	S/P cath. No o occlusion of coronaries.   ·	CE x 3 negative  ·	ECHO showed EF is 62%  ·	Trig 260 & LDL is 103. pt is diabetic. Will start him on low moderate intensity therapy, Lipitor 40 mg po qhs  ·	Cont his other home meds.   ·	D/C home    *Med rec reviewed. Plan of care D/W the pt. Time spent 33 minutes.

## 2021-02-25 NOTE — DISCHARGE NOTE NURSING/CASE MANAGEMENT/SOCIAL WORK - PATIENT PORTAL LINK FT
You can access the FollowMyHealth Patient Portal offered by [facility] by registering at the following website: [email or web address]. By joining [email or web address] [email or web address] portal, you will also be able to view your health information using other applications (apps) compatible with our system.

## 2021-03-02 DIAGNOSIS — R07.9 CHEST PAIN, UNSPECIFIED: ICD-10-CM

## 2021-03-02 DIAGNOSIS — Z82.49 FAMILY HISTORY OF ISCHEMIC HEART DISEASE AND OTHER DISEASES OF THE CIRCULATORY SYSTEM: ICD-10-CM

## 2021-03-02 DIAGNOSIS — E11.65 TYPE 2 DIABETES MELLITUS WITH HYPERGLYCEMIA: ICD-10-CM

## 2021-03-02 DIAGNOSIS — R00.2 PALPITATIONS: ICD-10-CM

## 2021-03-02 DIAGNOSIS — F41.9 ANXIETY DISORDER, UNSPECIFIED: ICD-10-CM

## 2021-03-02 DIAGNOSIS — Z79.84 LONG TERM (CURRENT) USE OF ORAL HYPOGLYCEMIC DRUGS: ICD-10-CM

## 2021-03-02 DIAGNOSIS — F17.210 NICOTINE DEPENDENCE, CIGARETTES, UNCOMPLICATED: ICD-10-CM

## 2021-08-11 ENCOUNTER — APPOINTMENT (OUTPATIENT)
Dept: PEDIATRIC ALLERGY IMMUNOLOGY | Facility: CLINIC | Age: 41
End: 2021-08-11

## 2021-12-23 ENCOUNTER — EMERGENCY (EMERGENCY)
Facility: HOSPITAL | Age: 41
LOS: 0 days | Discharge: HOME | End: 2021-12-23
Attending: EMERGENCY MEDICINE | Admitting: EMERGENCY MEDICINE
Payer: MEDICAID

## 2021-12-23 VITALS
SYSTOLIC BLOOD PRESSURE: 135 MMHG | RESPIRATION RATE: 20 BRPM | DIASTOLIC BLOOD PRESSURE: 80 MMHG | HEART RATE: 114 BPM | TEMPERATURE: 101 F | OXYGEN SATURATION: 98 %

## 2021-12-23 VITALS — HEIGHT: 65 IN | WEIGHT: 166.89 LBS

## 2021-12-23 DIAGNOSIS — R53.81 OTHER MALAISE: ICD-10-CM

## 2021-12-23 DIAGNOSIS — Z87.81 PERSONAL HISTORY OF (HEALED) TRAUMATIC FRACTURE: Chronic | ICD-10-CM

## 2021-12-23 DIAGNOSIS — R50.9 FEVER, UNSPECIFIED: ICD-10-CM

## 2021-12-23 DIAGNOSIS — R05.9 COUGH, UNSPECIFIED: ICD-10-CM

## 2021-12-23 DIAGNOSIS — U07.1 COVID-19: ICD-10-CM

## 2021-12-23 DIAGNOSIS — E11.9 TYPE 2 DIABETES MELLITUS WITHOUT COMPLICATIONS: ICD-10-CM

## 2021-12-23 DIAGNOSIS — Z98.1 ARTHRODESIS STATUS: Chronic | ICD-10-CM

## 2021-12-23 DIAGNOSIS — Z98.890 OTHER SPECIFIED POSTPROCEDURAL STATES: Chronic | ICD-10-CM

## 2021-12-23 LAB
ALBUMIN SERPL ELPH-MCNC: 4.6 G/DL — SIGNIFICANT CHANGE UP (ref 3.5–5.2)
ALP SERPL-CCNC: 71 U/L — SIGNIFICANT CHANGE UP (ref 30–115)
ALT FLD-CCNC: 23 U/L — SIGNIFICANT CHANGE UP (ref 0–41)
ANION GAP SERPL CALC-SCNC: 13 MMOL/L — SIGNIFICANT CHANGE UP (ref 7–14)
AST SERPL-CCNC: 20 U/L — SIGNIFICANT CHANGE UP (ref 0–41)
B-OH-BUTYR SERPL-SCNC: <0.2 MMOL/L — SIGNIFICANT CHANGE UP
BASE EXCESS BLDV CALC-SCNC: 0.7 MMOL/L — SIGNIFICANT CHANGE UP (ref -2–3)
BASOPHILS # BLD AUTO: 0.01 K/UL — SIGNIFICANT CHANGE UP (ref 0–0.2)
BASOPHILS NFR BLD AUTO: 0.2 % — SIGNIFICANT CHANGE UP (ref 0–1)
BILIRUB SERPL-MCNC: 0.3 MG/DL — SIGNIFICANT CHANGE UP (ref 0.2–1.2)
BUN SERPL-MCNC: 14 MG/DL — SIGNIFICANT CHANGE UP (ref 10–20)
CA-I SERPL-SCNC: 1.2 MMOL/L — SIGNIFICANT CHANGE UP (ref 1.15–1.33)
CALCIUM SERPL-MCNC: 9.4 MG/DL — SIGNIFICANT CHANGE UP (ref 8.5–10.1)
CHLORIDE SERPL-SCNC: 98 MMOL/L — SIGNIFICANT CHANGE UP (ref 98–110)
CO2 SERPL-SCNC: 21 MMOL/L — SIGNIFICANT CHANGE UP (ref 17–32)
CREAT SERPL-MCNC: 0.9 MG/DL — SIGNIFICANT CHANGE UP (ref 0.7–1.5)
EOSINOPHIL # BLD AUTO: 0.04 K/UL — SIGNIFICANT CHANGE UP (ref 0–0.7)
EOSINOPHIL NFR BLD AUTO: 0.7 % — SIGNIFICANT CHANGE UP (ref 0–8)
FLUAV AG NPH QL: NEGATIVE — SIGNIFICANT CHANGE UP
FLUBV AG NPH QL: NEGATIVE — SIGNIFICANT CHANGE UP
GAS PNL BLDV: 134 MMOL/L — LOW (ref 136–145)
GAS PNL BLDV: SIGNIFICANT CHANGE UP
GLUCOSE SERPL-MCNC: 120 MG/DL — HIGH (ref 70–99)
HCO3 BLDV-SCNC: 27 MMOL/L — SIGNIFICANT CHANGE UP (ref 22–29)
HCT VFR BLD CALC: 44.1 % — SIGNIFICANT CHANGE UP (ref 42–52)
HCT VFR BLDA CALC: 43 % — SIGNIFICANT CHANGE UP (ref 39–51)
HGB BLD CALC-MCNC: 14.4 G/DL — SIGNIFICANT CHANGE UP (ref 12.6–17.4)
HGB BLD-MCNC: 14.2 G/DL — SIGNIFICANT CHANGE UP (ref 14–18)
IMM GRANULOCYTES NFR BLD AUTO: 0.4 % — HIGH (ref 0.1–0.3)
LACTATE BLDV-MCNC: 1.9 MMOL/L — SIGNIFICANT CHANGE UP (ref 0.5–2)
LYMPHOCYTES # BLD AUTO: 0.34 K/UL — LOW (ref 1.2–3.4)
LYMPHOCYTES # BLD AUTO: 6.1 % — LOW (ref 20.5–51.1)
MCHC RBC-ENTMCNC: 28.7 PG — SIGNIFICANT CHANGE UP (ref 27–31)
MCHC RBC-ENTMCNC: 32.2 G/DL — SIGNIFICANT CHANGE UP (ref 32–37)
MCV RBC AUTO: 89.1 FL — SIGNIFICANT CHANGE UP (ref 80–94)
MONOCYTES # BLD AUTO: 0.63 K/UL — HIGH (ref 0.1–0.6)
MONOCYTES NFR BLD AUTO: 11.3 % — HIGH (ref 1.7–9.3)
NEUTROPHILS # BLD AUTO: 4.52 K/UL — SIGNIFICANT CHANGE UP (ref 1.4–6.5)
NEUTROPHILS NFR BLD AUTO: 81.3 % — HIGH (ref 42.2–75.2)
NRBC # BLD: 0 /100 WBCS — SIGNIFICANT CHANGE UP (ref 0–0)
PCO2 BLDV: 49 MMHG — SIGNIFICANT CHANGE UP (ref 42–55)
PH BLDV: 7.35 — SIGNIFICANT CHANGE UP (ref 7.32–7.43)
PLATELET # BLD AUTO: 208 K/UL — SIGNIFICANT CHANGE UP (ref 130–400)
PO2 BLDV: 22 MMHG — SIGNIFICANT CHANGE UP
POTASSIUM BLDV-SCNC: 4.6 MMOL/L — SIGNIFICANT CHANGE UP (ref 3.5–5.1)
POTASSIUM SERPL-MCNC: 4.4 MMOL/L — SIGNIFICANT CHANGE UP (ref 3.5–5)
POTASSIUM SERPL-SCNC: 4.4 MMOL/L — SIGNIFICANT CHANGE UP (ref 3.5–5)
PROT SERPL-MCNC: 7.3 G/DL — SIGNIFICANT CHANGE UP (ref 6–8)
RBC # BLD: 4.95 M/UL — SIGNIFICANT CHANGE UP (ref 4.7–6.1)
RBC # FLD: 12.2 % — SIGNIFICANT CHANGE UP (ref 11.5–14.5)
RSV RNA NPH QL NAA+NON-PROBE: NEGATIVE — SIGNIFICANT CHANGE UP
SAO2 % BLDV: 36.9 % — SIGNIFICANT CHANGE UP
SARS-COV-2 RNA SPEC QL NAA+PROBE: POSITIVE
SODIUM SERPL-SCNC: 132 MMOL/L — LOW (ref 135–146)
WBC # BLD: 5.56 K/UL — SIGNIFICANT CHANGE UP (ref 4.8–10.8)
WBC # FLD AUTO: 5.56 K/UL — SIGNIFICANT CHANGE UP (ref 4.8–10.8)

## 2021-12-23 PROCEDURE — 99284 EMERGENCY DEPT VISIT MOD MDM: CPT

## 2021-12-23 PROCEDURE — 71045 X-RAY EXAM CHEST 1 VIEW: CPT | Mod: 26

## 2021-12-23 RX ORDER — ACETAMINOPHEN 500 MG
975 TABLET ORAL ONCE
Refills: 0 | Status: COMPLETED | OUTPATIENT
Start: 2021-12-23 | End: 2021-12-23

## 2021-12-23 RX ORDER — SODIUM CHLORIDE 9 MG/ML
1000 INJECTION INTRAMUSCULAR; INTRAVENOUS; SUBCUTANEOUS ONCE
Refills: 0 | Status: COMPLETED | OUTPATIENT
Start: 2021-12-23 | End: 2021-12-23

## 2021-12-23 RX ADMIN — SODIUM CHLORIDE 1000 MILLILITER(S): 9 INJECTION INTRAMUSCULAR; INTRAVENOUS; SUBCUTANEOUS at 10:23

## 2021-12-23 RX ADMIN — Medication 975 MILLIGRAM(S): at 10:22

## 2021-12-23 NOTE — ED ADULT NURSE NOTE - OBJECTIVE STATEMENT
Patient presented to ED with c/o cough, fever, SOB. States daughter had high exposure to COVID a couple of days ago.

## 2021-12-23 NOTE — ED PROVIDER NOTE - NSFOLLOWUPCLINICS_GEN_ALL_ED_FT
[facility]  [facility] [address] [address]  Phone: [phone]  Fax:   Follow Up Time: 1-3 Days

## 2021-12-23 NOTE — ED PROVIDER NOTE - PHYSICAL EXAMINATION
GENERAL: Well-nourished, Well-developed. NAD.  HEAD: No visible or palpable bumps or hematomas. No ecchymosis behind ears B/L.  Eyes: PERRLA, EOMI. No asymmetry. No nystagmus. No conjunctival injection. Non-icteric sclera.  ENMT: MMM. No pharyngeal erythema or exudates. Uvula midline. No oral lesions.   Neck: Supple. FROM  CVS: RRR. Normal S1,S2. No murmurs appreciated on auscultation   RESP: No use of accessory muscles. Chest rise symmetrical with good expansion. Lungs clear to auscultation B/L. No wheezing, rales, or rhonchi auscultated.  GI: Normal auscultation of bowel sounds in all 4 quadrants. Soft, Nontender, Nondistended. No guarding or rebound tenderness. No CVAT B/L.  Skin: Warm, Dry. No rashes or lesions. Good cap refill < 2 sec B/L.  EXT: Radial and pedal pulses present B/L. No calf tenderness or swelling B/L. No palpable cords. No pedal edema B/L.

## 2021-12-23 NOTE — ED PROVIDER NOTE - ATTENDING CONTRIBUTION TO CARE
40 y/o male h/o DM, orthopedic surgery p/w "not feeling well" x 2 weeks with sx of cough, SOB, chills, persistent, denies modifying factors, + fever (Tm 103.2, temporal) x last night, denies headache, ear pain, sore throat, stuffy / runny nose, neck pain, abdominal pain, n/v/d, anorexia, change in bowel habits or urinary sx, rash or other associated complaints at present.    Old chart reviewed.  I have reviewed and agree with the initial nursing note, except as documented in my note.    VSS, awake, alert, non-toxic appearing, ears clear, oropharynx clear, no skin rash or lesions, no tracheal deviation, non-labored breathing without accessory muscle use apparent or pursed lip breathing, no retractions, speaks full sentences, chest CTAB, no w/r/r, +S1/S2, RRR, no m/r/g, abdomen soft, NT, ND, +BS, AO x 3, clear speech and steady gait.

## 2021-12-23 NOTE — ED PROVIDER NOTE - PROGRESS NOTE DETAILS
BH: VSS, sat>90% RA with exertion, well appearing, clinical picture c/w coronavirus, unlikely underlying cardiac or vascular complications, results d/w pt, states feels well enough to go home, no GAGNON, ambulating in ED w/o difficulty, advised they do not require hospitalization at this time although if symptoms change or worsen, may need to be hospitalized at a later date, also advised to self isolate.    The patient was given detailed return precautions and advised to return to the emergency department if any new symptoms developed, symptoms worsened or for any concerns. The patient was offered the opportunity to ask questions and verbalized that they understand the diagnosis and discharge instructions.

## 2021-12-23 NOTE — ED PROVIDER NOTE - PATIENT PORTAL LINK FT
You can access the FollowMyHealth Patient Portal offered by [facility] by registering at the following website: [email or web address]. By joining [employer]’s FollowMyHealth portal, you will also be able to view your health information using other applications (apps) compatible with our system.

## 2021-12-23 NOTE — ED PROVIDER NOTE - NS ED ROS FT
Constitutional: (+) fever (+) malaise (-) diaphoresis (+) chills (-) wt. loss (-) bodyaches (-) night sweats  Eyes: (-) visual changes (-) eye pain (-) eye discharge (-) photophobia (-) FB sensation  ENMT: (-) nasal or chest congestion (-) runny nose (-) sore throat (-) hoarseness  (-) hearing changes (-) ear pain (-) ear discharge or infections (-) neck pain (-) neck stiffness  Cardiac: (-) chest pain  (-) palpitations (-) syncope (-) edema  Respiratory: (+) cough (-) SOB (-) GAGNON  GI: (-) nausea (-) vomiting (-) diarrhea (-) abdominal pain   Neuro: (-) headache (-) dizziness (-) numbness/tingling to extremities B/L (-) weakness     Except as documented in the HPI, all other systems are negative.

## 2021-12-23 NOTE — ED ADULT NURSE NOTE - DATE OF LAST VACCINATION
[facility]  Discharge Note  Short Stay    Procedure(s) (LRB):  CYSTOSCOPY WITH TRANSURETHRAL DESTRUCTION OF PROSTATE,RFA (N/A)    OUTCOME: Patient tolerated treatment/procedure well without complication and is now ready for discharge.    DISPOSITION: Home or Self Care    FINAL DIAGNOSIS:  <principal problem not specified>    FOLLOWUP: In clinic    DISCHARGE INSTRUCTIONS:  No discharge procedures on file.       23-Apr-2021

## 2021-12-23 NOTE — ED ADULT TRIAGE NOTE - CHIEF COMPLAINT QUOTE
Patient c/o Cough sore throat  SOB and fevers x 2 weeks. Patient also states his doughtier had + COVID exposure this week

## 2021-12-23 NOTE — ED PROVIDER NOTE - OBJECTIVE STATEMENT
42 yo M pmhx DM presenting to the Ed for evaluation pt states he has not been feeling well for 2 weeks, last night developed fever, chills, cough. Denies any sick contacts or recent travel. Denies nausea, vomiting, chest pain, sob, abd pain, runny nose, sore throat.

## 2022-01-11 NOTE — ED PROVIDER NOTE - WR ORDER NAME 1
Detail Level: Simple Additional Notes: Patient consent was obtained to proceed with the visit and recommended plan of care after discussion of all risks and benefits, including the risks of COVID-19 exposure. Xray Chest 1 View- PORTABLE-Urgent

## 2022-04-08 NOTE — H&P PST ADULT - RESPIRATORY RATE (BREATHS/MIN)
No answer when called.  [address] [facility] shoulder Xray normal. (Option to try P.T. or see ortho) 22

## 2022-06-24 ENCOUNTER — APPOINTMENT (OUTPATIENT)
Dept: PEDIATRIC ALLERGY IMMUNOLOGY | Facility: CLINIC | Age: 42
End: 2022-06-24

## 2022-07-08 ENCOUNTER — APPOINTMENT (OUTPATIENT)
Dept: PEDIATRIC ALLERGY IMMUNOLOGY | Facility: CLINIC | Age: 42
End: 2022-07-08

## 2022-11-07 ENCOUNTER — APPOINTMENT (OUTPATIENT)
Dept: ORTHOPEDIC SURGERY | Facility: CLINIC | Age: 42
End: 2022-11-07

## 2022-11-07 VITALS — BODY MASS INDEX: 26.49 KG/M2 | WEIGHT: 159 LBS | HEIGHT: 65 IN

## 2022-11-07 DIAGNOSIS — S62.630A DISPLACED FRACTURE OF DISTAL PHALANX OF RIGHT INDEX FINGER, INITIAL ENCOUNTER FOR CLOSED FRACTURE: ICD-10-CM

## 2022-11-07 PROCEDURE — 73140 X-RAY EXAM OF FINGER(S): CPT | Mod: RT

## 2022-11-07 PROCEDURE — 99203 OFFICE O/P NEW LOW 30 MIN: CPT

## 2022-11-07 RX ORDER — MELOXICAM 15 MG/1
15 TABLET ORAL
Qty: 30 | Refills: 0 | Status: ACTIVE | COMMUNITY
Start: 2022-11-07 | End: 1900-01-01

## 2022-11-07 NOTE — HISTORY OF PRESENT ILLNESS
[de-identified] :  Patient is a 42-year-old male who reports to the office for evaluation of his right index finger pain that has been aggravating him for the past few days.  He states that he accidentally fell landing on his right index finger.  He went to [facility] urgent care where they performed x-rays and told the patient that he has a fracture distal phalanx.  He was placed in a finger splint which he has been using ever since.  He admits that the finger tip is slightly bent/droopy.  Admits to swelling around the finger tip.  Certain range of motion and palpating certain areas of the finger aggravate the patient's pain.  Denies any numbness or tingling.

## 2022-11-07 NOTE — DISCUSSION/SUMMARY
[de-identified] :   Patient was placed in a Alumafoam splint with the DIP in extension.  I advised patient to keep this splint on at all times and not to get it wet.  He understands and will comply.\par \par Mobic 15 mg PO QD PRN was sent to patient's pharmacy to help alleviate their symptoms. Patient will follow-up in 3 weeks for repeat x-rays and further evaluation.  All of the patient's questions/concerns were answered in detail.  \par \par The patient was seen under the supervision of Dr. Knapp.\par

## 2022-11-07 NOTE — PHYSICAL EXAM
[Right] : right hand [2nd Finger] : 2nd finger [Distal Phalanx] : distal phalanx [2nd] : 2nd [DIP Joint] : DIP joint [Finger Flexion] : finger flexion [Finger Extension] : finger extension [5___] : grasp 5[unfilled]/5 [] : no instability w/varus/valgus or ant/post stressing of fingers joints

## 2022-11-07 NOTE — IMAGING
[de-identified] :   X-rays taken of the patient's right index finger as [facility] were reviewed in the office today on a CD that was brought in by the patient.  It revealed a displaced mallet fracture noted at the 2nd distal phalanx.\par \par X-rays taken of the patient's right index finger in the office today post splint revealed a better alignment of his fracture with his DIP in extension.

## 2022-11-11 ENCOUNTER — APPOINTMENT (OUTPATIENT)
Dept: ORTHOPEDIC SURGERY | Facility: CLINIC | Age: 42
End: 2022-11-11

## 2022-11-11 PROCEDURE — 99213 OFFICE O/P EST LOW 20 MIN: CPT | Mod: 25

## 2022-11-11 PROCEDURE — 29130 APPL FINGER SPLINT STATIC: CPT | Mod: RT

## 2022-11-11 NOTE — PHYSICAL EXAM
[Right] : right hand [] : light touch intact throughout [de-identified] :  Mild edema noted of the distal phalanx of the right index finger. [FreeTextEntry9] :   Deferred secondary to fracture.

## 2022-11-11 NOTE — DISCUSSION/SUMMARY
[de-identified] : Today he was placed back into an Alumafoam splint.  After splint was applied, there is no numbness and tingling.  He will keep his regular scheduled follow-up appointment.\par \par \par Supervising physician:  Dr. Gatica

## 2022-11-25 ENCOUNTER — APPOINTMENT (OUTPATIENT)
Dept: ORTHOPEDIC SURGERY | Facility: CLINIC | Age: 42
End: 2022-11-25

## 2022-11-25 PROCEDURE — 99213 OFFICE O/P EST LOW 20 MIN: CPT

## 2022-11-25 PROCEDURE — 73140 X-RAY EXAM OF FINGER(S): CPT | Mod: RT

## 2022-11-25 NOTE — PHYSICAL EXAM
[de-identified] :  Patient has a slight leg mallet ring of the right index finger..  There is some tenderness to palpation of the DIP joint.  The nail plate is intact.  There is no swelling other than around the DIP joint.  There is no instability good motion to the PIP joint.

## 2022-11-25 NOTE — DATA REVIEWED
[FreeTextEntry1] :   Radiographs three views of the right index finger reviewed showing a mallet fracture with good alignment of the DIP joint

## 2022-11-25 NOTE — ASSESSMENT
[FreeTextEntry1] :  Patient has a mallet fracture of the right index finger.  Continue with his bracing.  He will see me back in a month with a repeat radiograph of the right index finger.  Natural history and healing process for this fracture was discussed

## 2022-11-25 NOTE — HISTORY OF PRESENT ILLNESS
[de-identified] : 42-year-old male with right index finger mallet fracture.  Injury occurred a couple weeks ago..  He is having difficulty with this splints.  Id he is now in a splint from the pharmacy.  The splint does not maintain good extension of the DIP joint

## 2022-12-23 ENCOUNTER — APPOINTMENT (OUTPATIENT)
Dept: ORTHOPEDIC SURGERY | Facility: CLINIC | Age: 42
End: 2022-12-23

## 2022-12-23 VITALS — WEIGHT: 159 LBS | BODY MASS INDEX: 26.49 KG/M2 | HEIGHT: 65 IN

## 2022-12-23 DIAGNOSIS — S62.630D DISPLACED FRACTURE OF DISTAL PHALANX OF RIGHT INDEX FINGER, SUBSEQUENT ENCOUNTER FOR FRACTURE WITH ROUTINE HEALING: ICD-10-CM

## 2022-12-23 PROCEDURE — 99213 OFFICE O/P EST LOW 20 MIN: CPT

## 2022-12-23 PROCEDURE — 73140 X-RAY EXAM OF FINGER(S): CPT | Mod: RT

## 2022-12-23 NOTE — PHYSICAL EXAM
[de-identified] : Patient is able to maintain extension of the finger.  Good PIP motion there is no erythema ecchymoses or abrasions.
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
## 2022-12-23 NOTE — HISTORY OF PRESENT ILLNESS
[de-identified] : 42-year-old male has a right index finger mallet fracture wearing his splint appropriately he comes in today for evaluation.  He does not have any significant complaints.

## 2022-12-23 NOTE — DATA REVIEWED
[FreeTextEntry1] : Radiographs 3 views of the right index finger show good position alignment of the right distal phalanx fracture intra-articular consistent with a mallet fracture.

## 2023-01-16 ENCOUNTER — APPOINTMENT (OUTPATIENT)
Dept: ORTHOPEDIC SURGERY | Facility: CLINIC | Age: 43
End: 2023-01-16

## 2023-02-13 ENCOUNTER — APPOINTMENT (OUTPATIENT)
Dept: ORTHOPEDIC SURGERY | Facility: CLINIC | Age: 43
End: 2023-02-13
Payer: MEDICAID

## 2023-02-13 DIAGNOSIS — S46.912A STRAIN OF UNSPECIFIED MUSCLE, FASCIA AND TENDON AT SHOULDER AND UPPER ARM LEVEL, LEFT ARM, INITIAL ENCOUNTER: ICD-10-CM

## 2023-02-13 DIAGNOSIS — M25.512 PAIN IN LEFT SHOULDER: ICD-10-CM

## 2023-02-13 PROCEDURE — 20610 DRAIN/INJ JOINT/BURSA W/O US: CPT | Mod: LT

## 2023-02-13 PROCEDURE — 73030 X-RAY EXAM OF SHOULDER: CPT | Mod: LT

## 2023-02-13 PROCEDURE — 99213 OFFICE O/P EST LOW 20 MIN: CPT | Mod: 25

## 2023-02-13 RX ORDER — MELOXICAM 15 MG/1
15 TABLET ORAL
Qty: 30 | Refills: 1 | Status: ACTIVE | COMMUNITY
Start: 2023-02-13 | End: 1900-01-01

## 2023-02-13 NOTE — PROCEDURE
[Large Joint Injection] : Large joint injection [Left] : of the left [Shoulder] : shoulder [Pain] : pain [Alcohol] : alcohol [Sterile technique used] : sterile technique used [____] : [unfilled] [] : Patient tolerated procedure well [Call if redness, pain or fever occur] : call if redness, pain or fever occur [Apply ice for 15min out of every hour for the next 12-24 hours as tolerated] : apply ice for 15 minutes out of every hour for the next 12-24 hours as tolerated

## 2023-02-13 NOTE — DISCUSSION/SUMMARY
[de-identified] : Mobic 15 mg PO QD PRN was sent to patient's pharmacy to help alleviate their symptoms.  The patient was advised to rest/ice the area.  They may alternate with warm compresses as needed.  Instructed not to perform any strenuous activity that may worsen symptoms.\par \par The shoulder conditioning program from the [facility] was given to the patient so they may try that at home. Explained to the patient that this may take 4 to 6 weeks to fully heal and that the first 2 weeks are usually the worst.\par \par With the patient's approval, and under sterile technique, I performed a steroid injection today.  See the attached procedure note for further details.  The patient was informed that their next cortisone injection could not be until a minimum of three months from today's date and the patient understands.  Explained to the patient that the full effect of the injection will take 3-5 days to kick in. \par \par He is a controlled diabetic and will monitor his glucose over the next week.  He will contact his endocrinologist if his glucose becomes too elevated for possible medication adjustment.  He understands and will comply.  The patient will follow-up on an as-needed basis.  All of the patient's questions/concerns were answered in detail.\par \par The patient was seen under the supervision of Dr. Knapp.

## 2023-02-13 NOTE — IMAGING
[de-identified] : X-rays taken of the patient's left shoulder in the office today reveal no obvious fractures, subluxations, or dislocations.  No significant abnormalities were seen.

## 2023-02-13 NOTE — PHYSICAL EXAM
[Left] : left shoulder [NL (0-180)] : full active abduction 0-180 degrees [NL (0-70)] : full internal rotation 0-70 degrees [NL (0-90)] : full external rotation 0-90 degrees [Sitting] : sitting [] : no atrophy [de-identified] : Pain with [place]'s testing

## 2023-02-13 NOTE — HISTORY OF PRESENT ILLNESS
[de-identified] : Patient is a 43-year-old male who reports to the office for evaluation of his left shoulder pain that has been aggravating him for the past 2 weeks.  He states that he performed a bench press at the gym which she believes aggravated/caused this pain.  Range of motion and palpating certain areas of the left shoulder aggravate the patient's pain.  He is right-hand dominant.  Admits to mild numbness and tingling down his left upper extremity.

## 2023-03-16 ENCOUNTER — APPOINTMENT (OUTPATIENT)
Dept: ORTHOPEDIC SURGERY | Facility: CLINIC | Age: 43
End: 2023-03-16

## 2023-04-10 ENCOUNTER — RX RENEWAL (OUTPATIENT)
Age: 43
End: 2023-04-10

## 2023-06-12 NOTE — ED ADULT TRIAGE NOTE - MEANS OF ARRIVAL
ANTEPARTUM PROGRESS NOTE    Interval Hx   No acute events. BP's have been mild range.    Subjective   Patient reports feeling well this morning physically. Denies HA, visual changes, CP/SOB, RUQ pain. She denies vaginal bleeding, LOF, contractions. Endorses good fetal movement.     Patient tearful and expressing significant frustration over her admission to the hospital. She had to miss her baby shower yesterday, and it is her older daughter's birthday this week. Patient also has followed with an MFM at [facility] Dr Letty Montgomery and is hoping her care can be transferred there vs. going home entirely. Discussed that medical recommendation for pre-eclampsia with severe features is inpatient management, but decision to stay will ultimately be up to patient. Discussed short term goals of growth US today, serial BP's off Magnesium to get data on BP control at this time and patient amenable to the above.    Objective   PHYSICAL EXAM:  BP (!) 142/104 (BP Location: LUE - Left upper extremity, Patient Position: Semi-Ferguson's)   Pulse 65   Temp 98 °F (36.7 °C) (Oral)   Resp 16   Ht 5' 6.93\" (1.7 m)   Wt 100.5 kg   LMP 12/07/2022 (Exact Date)   BMI 34.77 kg/m²   BSA 2.11 m²      Temp:  [97.3 °F (36.3 °C)-98.4 °F (36.9 °C)] 98 °F (36.7 °C)  Heart Rate:  [63-80] 65  Resp:  [16-18] 16  BP: (138-153)/() 142/104  Body mass index is 34.77 kg/m².       General: Well developed, well nourished. Tearful over hospital admission.   Resp: Respirations are non-labored. No respiratory distress. CTAB.  CV: Normal peripheral perfusion. RRR.  Abdomen: Soft, gravid, non-tender. No RUQ pain on palpation   Extremities: Non-tender, 1+ LE edema.   Neurological: A&O x3. Patellar DTRs 3+, no clonus  Skin: Warm, dry, normal for ethnicity.   Psychiatric: Cooperative.     Presentation: breech 06/09    PM NST: 125/mod/ +accels (10x10) /no decels  Pearson: no contractions    Scheduled Medications  Current Facility-Administered Medications    Medication Dose Route Frequency Provider Last Rate Last Admin   • Prenatal One Daily 27-0.8 MG Tab 1 tablet  1 tablet Oral Daily Zuleika Lynn DO       • Docosahexaenoic Acid Cap 1 Capful  1 Capful Oral Daily Zuleika Lynn DO       • sodium chloride (PF) 0.9 % injection 2 mL  2 mL Intracatheter 2 times per day Alfie Daniel   2 mL at 06/12/23 0153   • labetalol (NORMODYNE) tablet 200 mg  200 mg Oral 2 times per day Alfie Daniel   200 mg at 06/11/23 2121      Current Facility-Administered Medications   Medication Dose Route Frequency Provider Last Rate Last Admin   • lactated ringers infusion   Intravenous Continuous Alfie Daniel   Completed at 06/10/23 1910        PRN Medications  Current Facility-Administered Medications   Medication Dose Route Frequency Provider Last Rate Last Admin   • aluminum-magnesium hydroxide-simethicone (MAALOX) 200-200-20 MG/5ML suspension 30 mL  30 mL Oral Q4H PRN Alfie Daniel       • docusate sodium (COLACE) capsule 200 mg  200 mg Oral Nightly PRN Alfie Daniel       • acetaminophen (TYLENOL) tablet 650 mg  650 mg Oral Q4H PRN Alfie Daniel       • calcium carbonate (TUMS) chewable tablet 500 mg  500 mg Oral Q4H PRN Alfie Daniel       • famotidine (PEPCID) tablet 20 mg  20 mg Oral BID PRN Alfie Daniel       • sodium chloride 0.9 % flush bag 25 mL  25 mL Intravenous PRN Alfie Daniel       • labetalol (NORMODYNE) injection 20 mg  20 mg Intravenous PRN Alfie Daniel       • labetalol (NORMODYNE) injection 40 mg  40 mg Intravenous PRN Alfie Daniel       • labetalol (NORMODYNE) injection 80 mg  80 mg Intravenous PRN Alfie Daniel       • hydrALAZINE (APRESOLINE) injection 10 mg  10 mg Intravenous PRN Alfie Daniel       • calcium gluconate 10 % injection 1 g  1 g Intravenous PRN Alfie Daniel            Recent Labs   Lab 06/12/23  0740   SODIUM 137   POTASSIUM 4.4   CHLORIDE 110   CO2 20*   BUN 19   CREATININE  0.65   CALCIUM 8.8   ALBUMIN 2.6*   BILIRUBIN 0.2   ALKPT 63   GPT 28   AST 25   GLUCOSE 82       Assessment/Plan   Gillian is a 42 year old  at 26w5d, presenting as a transfer of care from [facility] in the setting of severe range blood pressures requiring IV antihypertensive medications.    #cHTN with Superimposed PreE with Severe Features vs. PreE w/ Severe Features   - antihypertensives at outside hospital: IV labetalol 20, 40, 80, IV Hydralazine 10 mg.   -Patient denies symptoms of preeclampsia  -Physical exam wnl.   -Labs: originally notable for Cr elevation which then normalized back to baseline   - AM CBC/CMP stable  - PCR 1187, Hgb 13.6, WBC 13.8>14.2, >>204, Cr 0.73 (stable from 4 months ago)>1.00>>0.65, AST/ALT 19/17>>25/28, PC ratio (baseline 4 mos ago) unable to calculate>  1187 on admission .   - Will collect 24 hour urine protein   -s/p Magnesium sulfate for 24 hours   - Labetalol 200 mg BID  - BP's persistently elevated mild range     #FWB  -Monitoring: NST BID  -BMZ -6/10  -Plan for repeat growth ultrasound today   -GBS status unknown   -s/p NICU consult      Inpatient care:  -Diet: Regular  -Activity: Ad Juany  -DVT ppx: SCDs in bed, encourage ambulation   -Access: Peripheral IV    Attending: Dr. Neil Denny MD, PGY-2  2023 8:42 AM     ambulatory

## 2023-07-31 ENCOUNTER — APPOINTMENT (OUTPATIENT)
Dept: ORTHOPEDIC SURGERY | Facility: CLINIC | Age: 43
End: 2023-07-31
Payer: MEDICAID

## 2023-07-31 DIAGNOSIS — S43.431D SUPERIOR GLENOID LABRUM LESION OF RIGHT SHOULDER, SUBSEQUENT ENCOUNTER: ICD-10-CM

## 2023-07-31 DIAGNOSIS — G89.29 PAIN IN RIGHT KNEE: ICD-10-CM

## 2023-07-31 DIAGNOSIS — M25.561 PAIN IN RIGHT KNEE: ICD-10-CM

## 2023-07-31 DIAGNOSIS — S83.272D COMPLEX TEAR OF LATERAL MENISCUS, CURRENT INJURY, LEFT KNEE, SUBSEQUENT ENCOUNTER: ICD-10-CM

## 2023-07-31 PROCEDURE — 99214 OFFICE O/P EST MOD 30 MIN: CPT

## 2023-08-02 PROBLEM — S83.272D COMPLEX TEAR OF LATERAL MENISCUS OF LEFT KNEE AS CURRENT INJURY, SUBSEQUENT ENCOUNTER: Status: ACTIVE | Noted: 2023-08-02

## 2023-08-02 PROBLEM — M25.561 CHRONIC PAIN OF RIGHT KNEE: Status: ACTIVE | Noted: 2023-08-02

## 2023-08-02 PROBLEM — S43.431D SUPERIOR GLENOID LABRUM LESION OF RIGHT SHOULDER, SUBSEQUENT ENCOUNTER: Status: ACTIVE | Noted: 2023-08-02
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
## 2023-12-22 NOTE — PRE-ANESTHESIA EVALUATION ADULT - MALLAMPATI CLASS
Subjective   Patient ID: Janiya is a 4 year old female who is accompanied by:mother     Chief Complaint   Patient presents with    Referral     Audiology and ophthalmology        4-year-old  female with developmental delay is here for follow-up.  She failed the hearing screening at her  but it was noted that it was most likely due to unable to follow directions and cooperate fully.  She also failed their hearing screen as well.  She was recently treated for bilateral otitis media last month and has completed a 10-day course of amoxicillin.  Mom's not noticed any ear complaints such as tugging or digging in the ears or complaining of ear pain there has been no drainage from the ears.  She currently has some mild nasal congestion with occasional coughing.  No fevers.  Mom's had no indication of any visual problems such as holding things extremely close or squinting of her eyes or tilting of her head to look at things.  There is no family history of hearing loss in early childhood however she does have a maternal aunt that had hearing impairment that was presumably due to recurrent ear infections.  She was also being followed for mild anemia and mom states that she has completed the supplemental iron prescription that was written.  She has no pica or signs of easy fatigue or paleness of the skin.  She is currently in  and receiving intervention for speech and language.  Review of Systems   Constitutional:  Negative for activity change, appetite change, crying, fever and irritability.   HENT:  Positive for congestion. Negative for nosebleeds.    Eyes:  Negative for photophobia, pain, redness and visual disturbance.   Psychiatric/Behavioral:  The patient is hyperactive.      Janiya has a past medical history of No known problems.  Janiya has Abnormal developmental screening; Facial eczema; Keratosis pilaris; Iron deficiency anemia; Picky eater; and Speech delay on their problem  list.  Janiya has a past surgical history that includes No past surgeries.  Her family history is not on file.  Janiya reports that she has never smoked. She has never used smokeless tobacco. She reports that she does not use drugs.  Janiya has a current medication list which includes the following prescription(s): albuterol, hydrocortisone, and ferrous sulfate.  Janiya   Current Outpatient Medications   Medication Sig Dispense Refill    albuterol 108 (90 Base) MCG/ACT inhaler Take 2 inhalations every 4 hours as needed 1 each 0    hydroCORTisone (CORTIZONE) 2.5 % ointment Apply topically 2 times daily. 30 g 3    ferrous sulfate 220 (44 Fe) MG/5ML liquid Take 5 mLs by mouth daily. 150 mL 1     No current facility-administered medications for this visit.     Janiya has No Known Allergies.    Objective   Vitals:Temp 97.1 °F (36.2 °C) (Tympanic)   Ht 3' 2.43\" (0.976 m)   Wt 16.4 kg (36 lb 2.5 oz)   HC 49.5 cm (19.49\")   BMI 17.22 kg/m²   BSA 0.65 m²   Physical Exam  Vitals and nursing note reviewed.   Constitutional:       General: She is active.      Appearance: She is well-developed.   HENT:      Head: Normocephalic and atraumatic.      Right Ear: Tympanic membrane normal.      Left Ear: Tympanic membrane normal.      Nose: Congestion present. No rhinorrhea.      Mouth/Throat:      Pharynx: No oropharyngeal exudate or posterior oropharyngeal erythema.      Neck: Neck supple.   Eyes:      General:         Right eye: No discharge.         Left eye: No discharge.      Extraocular Movements: Extraocular movements intact.      Conjunctiva/sclera: Conjunctivae normal.      Pupils: Pupils are equal, round, and reactive to light.   Cardiovascular:      Rate and Rhythm: Normal rate and regular rhythm.      Heart sounds: Normal heart sounds.   Pulmonary:      Effort: Pulmonary effort is normal.   Abdominal:      Palpations: Abdomen is soft.   Skin:     Coloration: Skin is not pale.   Neurological:       Mental Status: She is alert.      Gait: Gait normal.     Assessment   Problem List Items Addressed This Visit    None  Visit Diagnoses       Abnormal hearing screen    -  Primary    Relevant Orders    SERVICE TO AUDIOLOGY    Screening for iron deficiency anemia        Relevant Orders    POCT Hemoglobin (Completed)    Failed vision screen        Relevant Orders    SERVICE TO PEDIATRIC OPHTHALMOLOGY    Otitis media follow-up, infection resolved              MEDICAL DECISION MAKING  This is a 4-year-old female with speech and language delay which also affects her attention span and ability to follow directions.  Her ear exam today appears normal in terms of the appearance of the eardrums.  However she was unable to pass hearing screen at school most likely due to inability to cooperate.  We will refer her to audiology for more controlled setting for hearing exam which may include ABR's if they are not able to perform testing.  She also had an abnormal vision screener results showing cemented stigmatism with recommendation to be referred.  We will refer to ophthalmology as she is behind in her speech and language development and will probably require an exam with dilatation of the eyes.  We repeated her hemoglobin today because of the history of anemia and picky eating habits.  She completed a course of oral iron therapy.  Repeat hemoglobin today shows a normal hemoglobin.  Encourage mom to still encouraged healthy eating habits.  Also recommend multivitamin with iron for the time being.    Instructed to call if problem worsens or does not improve within the next 24 hours otherwise follow-up in 3 months     Class III - visualization of the soft palate and the base of the uvula

## 2024-02-06 NOTE — ED ADULT NURSE NOTE - CAS ELECT INFOMATION PROVIDED
[facility] [place]  INTAKE FORM  Date of referral: 24   Patient Name: Guy Sheikh   : 2019   MRN: [id number]   Phone: [phone]   Insurance Provider: N/A   Insurance ID #: [id number]    Insurance Group #: N/A     WT (kg):   Wt Readings from Last 1 Encounters:   24 12.2 kg (27 lb 0.1 oz) (5 %, Z= -1.66)*     * Growth percentiles are based on CDC (Boys, 2-20 Years) data.             HT (cm):   Ht Readings from Last 1 Encounters:   24 2' 10.88\" (0.886 m) (2 %, Z= -2.03)*     * Growth percentiles are based on CDC (Boys, 2-20 Years) data.       Diagnosis:   Patient Active Problem List   Diagnosis   • Poor weight gain in child   • Short stature        ICD 10 CODE: R62.52   Last Infusion: NA    Allergies:   ALLERGIES:  No Known Allergies     Please fax this form along with the order, any labs, pre-meds, and   nursing orders that are required for the infusion.     Medication: ARGININE, Clonidine    Dose:  Arginine 500 mg/kg   Clonidine 0.05mg    Frequency     Induction:  NA  weeks  Maintenance:  NA      Premedications needed:     [x] None    Acetaminophen            Dosing: []10 mg/kg                         []15 mg/kg                         [] Other: mg    Diphenhydramine            Dosing: []1 mg/kg                         [] Other: mg    IV Steroids: Hydrocortisone             Dosing: []100 mg                          [] Other: mg                       Methylprednisolone             Dosing: []40 mg                          [] Other: mg              Previous Therapies: NA                     Referring Provider: [facility] Provider: Dr. Keely Phan     Address: Verónica Solomon RN     [facility]   PHONE: [phone]  FAX: [phone]          
DC instructions

## 2024-04-03 NOTE — ED PROVIDER NOTE - PHYSICAL EXAMINATION
[facility]  [address], [place] [address]             Progress Note    Subjective   Patient Eva Osei is a 40 year old female     Chief Complaint   Patient presents with    Office Visit    Forms Completion     HPI  Patient presenting to the clinic for    IIH  -NMH with neurology on 4/16/2024  -says still with symptoms of frequent headaches which are incapacitating as they can last all day and multiple days in a row. Says she is now also having blurry vision on some days анна in sunshine  -still taking her diamox medication and toradol prn for pain, which doesn't seem to be working  -requesting work leave forms and disability forms to be filled out today to cover her days missed from now till neurology appt    Current Medications  Current Outpatient Medications   Medication Sig Dispense Refill    ketorolac (TORADOL) 10 MG tablet Take 1 tablet by mouth every 6 hours as needed for Pain. 30 tablet 0    albuterol 108 (90 Base) MCG/ACT inhaler Inhale 2 puffs into the lungs every 4 hours as needed for Shortness of Breath or Wheezing. 1 each 0    acetaZOLAMIDE (DIAMOX SEQUELS) 500 MG extended release (12 HR) capsule        No current facility-administered medications for this visit.       Allergies  ALLERGIES:   Allergen Reactions    No Known Allergies Other (See Comments)       Review of Systems   Constitutional:  Negative for chills and fever.   Eyes:  Positive for visual disturbance (intermittent blurry vision).   Respiratory:  Negative for shortness of breath.    Cardiovascular:  Negative for chest pain.   Neurological:  Positive for headaches.       Objective  Visit Vitals  /82 (BP Location: LUE - Left upper extremity, Patient Position: Sitting, Cuff Size: Large Adult)   Pulse 87   Temp 98.4 °F (36.9 °C) (Temporal)   Ht 5' 1\" (1.549 m)   Wt 101.4 kg (223 lb 8.7 oz)   LMP 03/23/2024 (Approximate)   BMI 42.24 kg/m²       Physical Exam  Constitutional:       Appearance: Normal  appearance.   HENT:      Head: Normocephalic and atraumatic.   Cardiovascular:      Rate and Rhythm: Normal rate and regular rhythm.      Heart sounds: Normal heart sounds.   Pulmonary:      Effort: Pulmonary effort is normal.      Breath sounds: Normal breath sounds.   Skin:     General: Skin is warm and dry.   Neurological:      General: No focal deficit present.      Mental Status: She is alert and oriented to person, place, and time.   Psychiatric:         Mood and Affect: Mood normal.         Behavior: Behavior normal.          Labs  Lab Results Reviewed        Assessment and Plan  There are no diagnoses linked to this encounter.  Problem List Items Addressed This Visit          Cardiac and Vasculature    Essential hypertension - Primary     -stable  -continue diamox             Eye    Blurry vision     -suspect 2/2 IIH  -will be seeing ophthalmology at [facility]             Neuro    IIH (idiopathic intracranial hypertension)     -continue management per neurology  -will be seeing specialist at [facility] on April 16th  -completed work leave form and short term disability form for pt to be off until 4/17/2024            4/3/2024  Judith Cameron, CNP     Physical Examination:   VITAL SIGNS: I have reviewed vital signs.  CONSTITUTIONAL: well appearing, NAD.   SKIN: Skin exam is warm and dry.  No rashes  HEAD: Normocephalic; atraumatic.  EYES: PERRL, EOM intact; conjunctiva and sclera clear.  ENT: No nasal discharge; airway clear.  NECK: Supple; non tender.  CARD: S1, S2 reg,+ chest wall ttp over sternum and lateral to sternum b/l.   RESP: CTAB. No wheezes, rales or rhonchi.  ABD: soft; non-distended; non-tender  EXT: Normal ROM.   NEURO: Alert, oriented. Grossly unremarkable. No focal deficits. Ambulatory with steady gait.  PSYCH: Cooperative, appropriate.

## 2024-05-21 NOTE — REASON FOR VISIT
[facility]  Urology Progress Note    Frankie Lemus Patient Status:  Inpatient    1950 MRN [id number]   Location [facility] 3NW-A Attending Yael Cadena MD   Hosp Day # 4 PCP PEÑA EVANGELISTA     Subjective:  Frankie Lemus is a(n) 73 year old female.    S/P RAL simple cystectomy and ileal conduit 24 with Dr. Cadena     Current complaints: Feeling somewhat better today.  No nausea, vomiting, fever, or chills.  Passing some flatus.  Reports some mucous in colostomy bag.  Ambulating.      Objective:  General appearance: alert, appears stated age, and cooperative  Blood pressure 137/85, pulse 89, temperature 98.1 °F (36.7 °C), temperature source Oral, resp. rate 16, height 5' 9\" (1.753 m), weight 123 lb 7.3 oz (56 kg), SpO2 94%.  Lungs: non-labored respirations  Abdomen: soft, rounded with expected incisional tenderness.  Stoma productive with blood tinged urine (UOP - 1050 mL/24 hours), distal ends of catheters exiting stoma.  Colostomy bag with some air in bag, some mucous.  LISSETT drain drain with SS fluid (output - 235 mL/24 hours)  Lab Results   Component Value Date    WBC 11.6 2024    HGB 15.9 2024    HCT 47.1 2024    .0 2024    CREATSERUM 0.56 2024    BUN 16 2024     2024    K 4.4 2024     2024    CO2 23.0 2024     2024    CA 9.2 2024    PGLU 148 2024            XR ABDOMEN OBSTRUCTIVE SERIES ROUTINE(2 VW)(CPT=74019)    Result Date: 2024  CONCLUSION:  Diffuse air-filled distended bowel throughout abdomen most likely represents diffuse ileus with distal obstruction not excluded.  Postoperative changes are noted.   LOCATION:  [place]    Dictated by (CST): Robby Byrd MD on 2024 at 1:00 PM     Finalized by (CST): Robby Byrd MD on 2024 at 1:01 PM         Assessment:    URINARY RETENTION, CHRONIC PELVIC PAIN  S/P RAL simple cystectomy and ileal conduit 24 with  Dr. Cadena  Afebrile, VSS  AM labs pending  Ambulating    ILEUS  Obstructive series 5/20/24: Diffuse air-filled distended bowel throughout abdomen most likely represents diffuse ileus with distal obstruction not excluded.  Postoperative changes are noted     Plan:    Imaging reviewed with Dr. Cadena yesterday  Encouraged ambulation and use of IS x 10/hr  NPO  IV fluids  Pain management - limit narcotics as able  Bowel regimen  Supportive care  Follow labs, temp, UOP  Lovenox 40 mg subcutaneous daily   Stoma nurse and hospitalist following    Above discussed with patient, nurse  Will update Dr. Surinder Quintana PA-C  [facility]  Department of Urology  5/21/2024  5:40 AM    Addendum:  Recent Labs   Lab 05/19/24  0805 05/20/24  0602 05/21/24  0528   RBC 5.16 5.50* 4.97   HGB 15.0 15.9 14.6   HCT 45.9 47.1 42.4   MCV 89.0 85.6 85.3   MCH 29.1 28.9 29.4   MCHC 32.7 33.8 34.4   RDW 13.0 12.8 12.7   NEPRELIM 11.56* 10.23* 8.68*   WBC 12.5* 11.6* 9.9   .0 285.0 270.0       Recent Labs   Lab 05/19/24  0805 05/20/24  0603 05/21/24  0528   * 137* 132*   BUN 12 16 26*   CREATSERUM 0.59 0.56 0.48*   EGFRCR 96 97 101   CA 8.8 9.2 8.3*    134* 138   K 3.9 4.4 4.1    102 108   CO2 22.0 23.0 23.0     Above reviewed with Dr. Cadena - will keep NPO, can have some ice chips (do not over do)  Follow labs    Above discussed with nurse.    ANNE García  Urology     [FreeTextEntry2] : Rt. Index.

## 2024-05-23 ENCOUNTER — APPOINTMENT (OUTPATIENT)
Dept: ORTHOPEDIC SURGERY | Facility: CLINIC | Age: 44
End: 2024-05-23

## 2024-05-24 ENCOUNTER — APPOINTMENT (OUTPATIENT)
Dept: ORTHOPEDIC SURGERY | Facility: CLINIC | Age: 44
End: 2024-05-24
Payer: MEDICAID

## 2024-05-24 DIAGNOSIS — G56.02 CARPAL TUNNEL SYNDROME, LEFT UPPER LIMB: ICD-10-CM

## 2024-05-24 PROCEDURE — 73110 X-RAY EXAM OF WRIST: CPT | Mod: LT

## 2024-05-24 PROCEDURE — 99213 OFFICE O/P EST LOW 20 MIN: CPT

## 2024-05-24 NOTE — DISCUSSION/SUMMARY
[de-identified] : The patient was advised of the diagnosis.  The natural history of the pathology was explained in full to the patient in layman's terms. We discussed the nature of the nerve as an electrical cable and what happens to the nerve in carpal tunnel syndrome.  We discussed that treatment for night symptoms included night bracing.  We discussed the possibility of injection when symptoms were intermittent or in patients who were unwilling to undergo surgery with constant symptoms.  We discussed that injection is a diagnostic and therapeutic aide and what this means.  We discussed the use of nerve testing in cases when diagnosis was in doubt or for confirmation to exclude alternate pathology.  We discussed that if symptoms were 24/7 surgery was recommended to give the nerve the best chance to recover but that once symptoms were constant, the nerve may not recover even with surgery.  We discussed that if left alone the nerve progression could worsen and that treatment was indicated to prevent progression of nerve compression.  The longer the nerve is left, the more likely to cause worsening irreversible damage.  All questions were answered.  The risks and benefits of surgical and non-surgical treatment alternatives were explained in full to the patient.  I will send the patient for an EMG.  Gave him a brace in the office today for symptomatic relief.  Provide symptoms were discussed.  Discussed the possibility of carpal tunnel revision.  I will have the patient follow-up with Dr. Gatica following the EMG results to discuss the results and for possible surgical planning. All questions and concerns addressed to patient's satisfaction. Patient expresses full understanding of treatment plan.

## 2024-05-24 NOTE — IMAGING
[de-identified] : L hand:  Tender volar wrist  Good finger ROM  +Tinels  +Phalens  +Compression test  Decreased sensation median nerve distribution No thenar atrophy.  Neurovascularly intact.  X-rays left wrist taken in the office today:  No acute fractures, subluxations, or dislocations.

## 2024-05-24 NOTE — HISTORY OF PRESENT ILLNESS
[de-identified] : 44-year-old male here for evaluation of numbness and tingling to his left hand and wrist.  Reports the numbness and tingling is worse at night and wakes him up from his sleep.  Denies any trauma or falls.  He does report a history of a carpal tunnel release on his left hand performed by Dr. Gatica about 5 years ago.

## 2024-05-30 ENCOUNTER — APPOINTMENT (OUTPATIENT)
Dept: NEUROLOGY | Facility: CLINIC | Age: 44
End: 2024-05-30
Payer: MEDICAID

## 2024-05-30 PROCEDURE — 95911 NRV CNDJ TEST 9-10 STUDIES: CPT

## 2024-05-30 PROCEDURE — 95886 MUSC TEST DONE W/N TEST COMP: CPT

## 2024-06-04 ENCOUNTER — NON-APPOINTMENT (OUTPATIENT)
Age: 44
End: 2024-06-04

## 2024-06-04 ENCOUNTER — APPOINTMENT (OUTPATIENT)
Dept: ORTHOPEDIC SURGERY | Facility: CLINIC | Age: 44
End: 2024-06-04
Payer: MEDICAID

## 2024-06-04 DIAGNOSIS — G56.03 CARPAL TUNNEL SYNDROM,BILATERAL UPPER LIMBS: ICD-10-CM

## 2024-06-04 DIAGNOSIS — M67.432 GANGLION, LEFT WRIST: ICD-10-CM

## 2024-06-04 PROCEDURE — 99204 OFFICE O/P NEW MOD 45 MIN: CPT

## 2024-06-04 PROCEDURE — 99214 OFFICE O/P EST MOD 30 MIN: CPT
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
## 2024-06-13 ENCOUNTER — OUTPATIENT (OUTPATIENT)
Dept: OUTPATIENT SERVICES | Facility: HOSPITAL | Age: 44
LOS: 1 days | End: 2024-06-13
Payer: MEDICAID

## 2024-06-13 VITALS
HEART RATE: 88 BPM | HEIGHT: 66 IN | OXYGEN SATURATION: 99 % | RESPIRATION RATE: 17 BRPM | DIASTOLIC BLOOD PRESSURE: 74 MMHG | TEMPERATURE: 98 F | WEIGHT: 158.07 LBS | SYSTOLIC BLOOD PRESSURE: 110 MMHG

## 2024-06-13 DIAGNOSIS — M67.432 GANGLION, LEFT WRIST: ICD-10-CM

## 2024-06-13 DIAGNOSIS — Z01.818 ENCOUNTER FOR OTHER PREPROCEDURAL EXAMINATION: ICD-10-CM

## 2024-06-13 DIAGNOSIS — Z98.1 ARTHRODESIS STATUS: Chronic | ICD-10-CM

## 2024-06-13 DIAGNOSIS — G56.02 CARPAL TUNNEL SYNDROME, LEFT UPPER LIMB: ICD-10-CM

## 2024-06-13 DIAGNOSIS — Z87.81 PERSONAL HISTORY OF (HEALED) TRAUMATIC FRACTURE: Chronic | ICD-10-CM

## 2024-06-13 DIAGNOSIS — Z98.890 OTHER SPECIFIED POSTPROCEDURAL STATES: Chronic | ICD-10-CM

## 2024-06-13 LAB
ALBUMIN SERPL ELPH-MCNC: 4 G/DL — SIGNIFICANT CHANGE UP (ref 3.5–5.2)
ALP SERPL-CCNC: 49 U/L — SIGNIFICANT CHANGE UP (ref 30–115)
ALT FLD-CCNC: 108 U/L — HIGH (ref 0–41)
ANION GAP SERPL CALC-SCNC: 15 MMOL/L — HIGH (ref 7–14)
AST SERPL-CCNC: 39 U/L — SIGNIFICANT CHANGE UP (ref 0–41)
BASOPHILS # BLD AUTO: 0.04 K/UL — SIGNIFICANT CHANGE UP (ref 0–0.2)
BASOPHILS NFR BLD AUTO: 0.6 % — SIGNIFICANT CHANGE UP (ref 0–1)
BILIRUB SERPL-MCNC: 0.2 MG/DL — SIGNIFICANT CHANGE UP (ref 0.2–1.2)
BUN SERPL-MCNC: 19 MG/DL — SIGNIFICANT CHANGE UP (ref 10–20)
CALCIUM SERPL-MCNC: 8.6 MG/DL — SIGNIFICANT CHANGE UP (ref 8.4–10.5)
CHLORIDE SERPL-SCNC: 100 MMOL/L — SIGNIFICANT CHANGE UP (ref 98–110)
CO2 SERPL-SCNC: 22 MMOL/L — SIGNIFICANT CHANGE UP (ref 17–32)
CREAT SERPL-MCNC: 1.1 MG/DL — SIGNIFICANT CHANGE UP (ref 0.7–1.5)
EGFR: 85 ML/MIN/1.73M2 — SIGNIFICANT CHANGE UP
EOSINOPHIL # BLD AUTO: 0.2 K/UL — SIGNIFICANT CHANGE UP (ref 0–0.7)
EOSINOPHIL NFR BLD AUTO: 2.8 % — SIGNIFICANT CHANGE UP (ref 0–8)
GLUCOSE SERPL-MCNC: 203 MG/DL — HIGH (ref 70–99)
HCT VFR BLD CALC: 44.5 % — SIGNIFICANT CHANGE UP (ref 42–52)
HGB BLD-MCNC: 14.9 G/DL — SIGNIFICANT CHANGE UP (ref 14–18)
IMM GRANULOCYTES NFR BLD AUTO: 0.3 % — SIGNIFICANT CHANGE UP (ref 0.1–0.3)
LYMPHOCYTES # BLD AUTO: 1.38 K/UL — SIGNIFICANT CHANGE UP (ref 1.2–3.4)
LYMPHOCYTES # BLD AUTO: 19.3 % — LOW (ref 20.5–51.1)
MCHC RBC-ENTMCNC: 28.3 PG — SIGNIFICANT CHANGE UP (ref 27–31)
MCHC RBC-ENTMCNC: 33.5 G/DL — SIGNIFICANT CHANGE UP (ref 32–37)
MCV RBC AUTO: 84.6 FL — SIGNIFICANT CHANGE UP (ref 80–94)
MONOCYTES # BLD AUTO: 0.53 K/UL — SIGNIFICANT CHANGE UP (ref 0.1–0.6)
MONOCYTES NFR BLD AUTO: 7.4 % — SIGNIFICANT CHANGE UP (ref 1.7–9.3)
NEUTROPHILS # BLD AUTO: 4.99 K/UL — SIGNIFICANT CHANGE UP (ref 1.4–6.5)
NEUTROPHILS NFR BLD AUTO: 69.6 % — SIGNIFICANT CHANGE UP (ref 42.2–75.2)
NRBC # BLD: 0 /100 WBCS — SIGNIFICANT CHANGE UP (ref 0–0)
PLATELET # BLD AUTO: 281 K/UL — SIGNIFICANT CHANGE UP (ref 130–400)
PMV BLD: 10.8 FL — HIGH (ref 7.4–10.4)
POTASSIUM SERPL-MCNC: 4.6 MMOL/L — SIGNIFICANT CHANGE UP (ref 3.5–5)
POTASSIUM SERPL-SCNC: 4.6 MMOL/L — SIGNIFICANT CHANGE UP (ref 3.5–5)
PROT SERPL-MCNC: 6.5 G/DL — SIGNIFICANT CHANGE UP (ref 6–8)
RBC # BLD: 5.26 M/UL — SIGNIFICANT CHANGE UP (ref 4.7–6.1)
RBC # FLD: 13.6 % — SIGNIFICANT CHANGE UP (ref 11.5–14.5)
SODIUM SERPL-SCNC: 137 MMOL/L — SIGNIFICANT CHANGE UP (ref 135–146)
WBC # BLD: 7.16 K/UL — SIGNIFICANT CHANGE UP (ref 4.8–10.8)
WBC # FLD AUTO: 7.16 K/UL — SIGNIFICANT CHANGE UP (ref 4.8–10.8)

## 2024-06-13 PROCEDURE — 36415 COLL VENOUS BLD VENIPUNCTURE: CPT

## 2024-06-13 PROCEDURE — 99214 OFFICE O/P EST MOD 30 MIN: CPT | Mod: 25

## 2024-06-13 PROCEDURE — 85025 COMPLETE CBC W/AUTO DIFF WBC: CPT

## 2024-06-13 PROCEDURE — 80053 COMPREHEN METABOLIC PANEL: CPT

## 2024-06-13 RX ORDER — PIOGLITAZONE HYDROCHLORIDE 15 MG/1
1 TABLET ORAL
Qty: 0 | Refills: 0 | DISCHARGE

## 2024-06-13 RX ORDER — SEMAGLUTIDE 0.68 MG/ML
1 INJECTION, SOLUTION SUBCUTANEOUS
Qty: 0 | Refills: 0 | DISCHARGE

## 2024-06-13 NOTE — H&P PST ADULT - HISTORY OF PRESENT ILLNESS
PATIENT DENIES CHEST PAIN, SHORTNESS OF BREATH, PALPITATIONS, COUGHING, FEVER, DYSURIA.    NO COUGH, FEVER, SORE THROAT, HEADACHE, LOSS OF TASTE OR SMELL. NO KNOWN EXPOSURE TO ANYONE WITH COVID. PATIENT WAS INSTRUCTED TO ISOLATE FROM NOW UNTIL THE SURGERY.    Anesthesia Alert  NO--Difficult Airway  NO--History of neck surgery or radiation  NO--Limited ROM of neck  NO--History of Malignant hyperthermia  NO--Personal or family history of Pseudocholinesterase deficiency  NO--Prior Anesthesia Complication  NO--Latex Allergy  NO--Loose teeth  NO--History of Rheumatoid Arthritis  NO--LOAN  NO--BLEEDING RISK    DASI=9.89 METS  RCRI=0
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
## 2024-06-13 NOTE — H&P PST ADULT - REASON FOR ADMISSION
45 Y/O MALE HERE FOR PRE-ADMISSION SURGICAL TESTING. PATIENT REPORTS LEFT HAND NUMBNESS X1 YEAR. EMG REVEALED + CTS. HE STATES HE ALSO HAS A GANGLION CYST TO HIS LEFT HAND THAT HE'S HAD FOR A WHILE. HE STATES IT'S HARD TO GRASP OBJECTS.  NOW FOR SCHEDULED LEFT ENDOSCOPIC CARPAL TUNNEL RELEASE, POSSIBLE OPEN. EXCISION OF LEFT DORSAL GANGLION CYST.

## 2024-06-14 DIAGNOSIS — G56.02 CARPAL TUNNEL SYNDROME, LEFT UPPER LIMB: ICD-10-CM

## 2024-06-14 DIAGNOSIS — Z01.818 ENCOUNTER FOR OTHER PREPROCEDURAL EXAMINATION: ICD-10-CM

## 2024-06-27 ENCOUNTER — RESULT REVIEW (OUTPATIENT)
Age: 44
End: 2024-06-27

## 2024-06-27 ENCOUNTER — OUTPATIENT (OUTPATIENT)
Dept: OUTPATIENT SERVICES | Facility: HOSPITAL | Age: 44
LOS: 1 days | Discharge: HOME | End: 2024-06-27
Payer: MEDICAID

## 2024-06-27 ENCOUNTER — APPOINTMENT (OUTPATIENT)
Dept: ORTHOPEDIC SURGERY | Facility: AMBULATORY SURGERY CENTER | Age: 44
End: 2024-06-27

## 2024-06-27 ENCOUNTER — TRANSCRIPTION ENCOUNTER (OUTPATIENT)
Age: 44
End: 2024-06-27

## 2024-06-27 VITALS
WEIGHT: 158.07 LBS | HEIGHT: 66 IN | TEMPERATURE: 98 F | RESPIRATION RATE: 16 BRPM | DIASTOLIC BLOOD PRESSURE: 82 MMHG | OXYGEN SATURATION: 100 % | SYSTOLIC BLOOD PRESSURE: 130 MMHG | HEART RATE: 67 BPM

## 2024-06-27 VITALS — RESPIRATION RATE: 28 BRPM | HEART RATE: 82 BPM | OXYGEN SATURATION: 99 %

## 2024-06-27 DIAGNOSIS — M67.432 GANGLION, LEFT WRIST: ICD-10-CM

## 2024-06-27 DIAGNOSIS — Z87.81 PERSONAL HISTORY OF (HEALED) TRAUMATIC FRACTURE: Chronic | ICD-10-CM

## 2024-06-27 DIAGNOSIS — Z98.890 OTHER SPECIFIED POSTPROCEDURAL STATES: Chronic | ICD-10-CM

## 2024-06-27 DIAGNOSIS — G56.02 CARPAL TUNNEL SYNDROME, LEFT UPPER LIMB: ICD-10-CM

## 2024-06-27 DIAGNOSIS — Z98.1 ARTHRODESIS STATUS: Chronic | ICD-10-CM

## 2024-06-27 DIAGNOSIS — Z86.16 PERSONAL HISTORY OF COVID-19: ICD-10-CM

## 2024-06-27 PROBLEM — Z78.9 OTHER SPECIFIED HEALTH STATUS: Chronic | Status: INACTIVE | Noted: 2024-06-13 | Resolved: 2024-06-26

## 2024-06-27 PROCEDURE — 88304 TISSUE EXAM BY PATHOLOGIST: CPT

## 2024-06-27 PROCEDURE — 71045 X-RAY EXAM CHEST 1 VIEW: CPT

## 2024-06-27 PROCEDURE — 29848 WRIST ENDOSCOPY/SURGERY: CPT | Mod: LT

## 2024-06-27 PROCEDURE — 88304 TISSUE EXAM BY PATHOLOGIST: CPT | Mod: 26

## 2024-06-27 PROCEDURE — 25111 REMOVE WRIST TENDON LESION: CPT | Mod: LT

## 2024-06-27 PROCEDURE — 71045 X-RAY EXAM CHEST 1 VIEW: CPT | Mod: 26

## 2024-06-27 RX ORDER — ACETAMINOPHEN 325 MG
1000 TABLET ORAL ONCE
Refills: 0 | Status: COMPLETED | OUTPATIENT
Start: 2024-06-27 | End: 2024-06-27

## 2024-06-27 RX ORDER — HYDROMORPHONE HCL 0.2 MG/ML
0.5 INJECTION, SOLUTION INTRAVENOUS
Refills: 0 | Status: DISCONTINUED | OUTPATIENT
Start: 2024-06-27 | End: 2024-06-27

## 2024-06-27 RX ORDER — BENZOCAINE AND MENTHOL 15; 3.6 MG/1; MG/1
1 LOZENGE ORAL ONCE
Refills: 0 | Status: COMPLETED | OUTPATIENT
Start: 2024-06-27 | End: 2024-06-27

## 2024-06-27 RX ORDER — ONDANSETRON HYDROCHLORIDE 2 MG/ML
4 INJECTION INTRAMUSCULAR; INTRAVENOUS ONCE
Refills: 0 | Status: DISCONTINUED | OUTPATIENT
Start: 2024-06-27 | End: 2024-06-27

## 2024-06-27 RX ORDER — DEXTROSE MONOHYDRATE AND SODIUM CHLORIDE 5; .3 G/100ML; G/100ML
1000 INJECTION, SOLUTION INTRAVENOUS
Refills: 0 | Status: DISCONTINUED | OUTPATIENT
Start: 2024-06-27 | End: 2024-06-27

## 2024-06-27 RX ADMIN — BENZOCAINE AND MENTHOL 1 LOZENGE: 15; 3.6 LOZENGE ORAL at 09:50

## 2024-06-27 RX ADMIN — Medication 1000 MILLIGRAM(S): at 07:33

## 2024-07-01 LAB — SURGICAL PATHOLOGY STUDY: SIGNIFICANT CHANGE UP

## 2024-07-11 ENCOUNTER — APPOINTMENT (OUTPATIENT)
Dept: ORTHOPEDIC SURGERY | Facility: CLINIC | Age: 44
End: 2024-07-11
Payer: MEDICAID

## 2024-07-11 DIAGNOSIS — G56.03 CARPAL TUNNEL SYNDROM,BILATERAL UPPER LIMBS: ICD-10-CM

## 2024-07-11 DIAGNOSIS — M67.432 GANGLION, LEFT WRIST: ICD-10-CM

## 2024-07-11 PROCEDURE — 99024 POSTOP FOLLOW-UP VISIT: CPT

## 2024-09-06 PROBLEM — U07.1 COVID-19: Chronic | Status: ACTIVE | Noted: 2024-06-26

## 2024-09-27 ENCOUNTER — APPOINTMENT (OUTPATIENT)
Dept: PEDIATRIC ALLERGY IMMUNOLOGY | Facility: CLINIC | Age: 44
End: 2024-09-27

## 2024-09-27 VITALS
DIASTOLIC BLOOD PRESSURE: 73 MMHG | BODY MASS INDEX: 25.71 KG/M2 | SYSTOLIC BLOOD PRESSURE: 115 MMHG | WEIGHT: 160 LBS | HEIGHT: 66 IN

## 2024-09-27 DIAGNOSIS — T88.6XXA ANAPHYLACTIC REACTION DUE TO ADVERSE EFFECT OF CORRECT DRUG OR MEDICAMENT PROPERLY ADMINISTERED, INITIAL ENCOUNTER: ICD-10-CM

## 2024-09-27 DIAGNOSIS — J30.2 OTHER ALLERGIC RHINITIS: ICD-10-CM

## 2024-09-27 DIAGNOSIS — J30.89 OTHER ALLERGIC RHINITIS: ICD-10-CM

## 2024-09-27 DIAGNOSIS — T45.0X5A ANAPHYLACTIC REACTION DUE TO ADVERSE EFFECT OF CORRECT DRUG OR MEDICAMENT PROPERLY ADMINISTERED, INITIAL ENCOUNTER: ICD-10-CM

## 2024-09-27 PROCEDURE — 99203 OFFICE O/P NEW LOW 30 MIN: CPT | Mod: 25

## 2024-09-27 PROCEDURE — 95004 PERQ TESTS W/ALRGNC XTRCS: CPT

## 2024-09-27 RX ORDER — PREDNISONE 5 MG/1
5 TABLET ORAL
Qty: 36 | Refills: 0 | Status: ACTIVE | COMMUNITY
Start: 2024-09-27 | End: 1900-01-01

## 2024-09-27 RX ORDER — AZELASTINE HYDROCHLORIDE 137 UG/1
0.1 SPRAY, METERED NASAL TWICE DAILY
Qty: 1 | Refills: 1 | Status: ACTIVE | COMMUNITY
Start: 2024-09-27 | End: 1900-01-01

## 2024-09-27 RX ORDER — EPINEPHRINE 0.3 MG/.3ML
0.3 INJECTION INTRAMUSCULAR
Qty: 1 | Refills: 0 | Status: ACTIVE | COMMUNITY
Start: 2024-09-27 | End: 1900-01-01

## 2024-09-27 RX ORDER — METFORMIN HYDROCHLORIDE 500 MG/1
500 TABLET, COATED ORAL
Refills: 0 | Status: ACTIVE | COMMUNITY

## 2024-09-27 NOTE — SOCIAL HISTORY
[House] : [unfilled] lives in a house  [Central Forced Air] : heating provided by central forced air [Central] : air conditioning provided by central unit [Dry] : dry [Cat] : cat [Humidifier] : does not use a humidifier [Dehumidifier] : does not use a dehumidifier [Cockroaches] : Patient states that there are no cockroaches in the home [Dust Mite Covers] : does not have dust mite covers [Feather Pillows] : does not have feather pillows [Feather Comforter] : does not have a feather comforter [Bedroom] : not in the bedroom [Basement] : not in the basement [Living Area] : not in the living area [Smokers in Household] : there are no smokers in the home

## 2024-09-27 NOTE — ASSESSMENT
[FreeTextEntry1] : The patient is a 44 year male with a history suggestive of Allergic Rhinitis. Allergy skin testing revealed significant positive reactions to environmental allergens, discussed avoidance measures including dust mite proof encasings and HEPA air purifier. Discussed the benefit of allergy immunotherapy to which the patient understood and agreed.

## 2024-09-27 NOTE — REVIEW OF SYSTEMS
[Rhinorrhea] : rhinorrhea [Nasal Congestion] : nasal congestion [Sneezing] : sneezing [Cough] : cough [Nl] : Genitourinary

## 2024-09-27 NOTE — HISTORY OF PRESENT ILLNESS
[None] : The patient is currently asymptomatic [de-identified] : RUPA SANTOS is a 44 year old male with a history of allergic rhinitis for many years. He has managed over the years with OTC antihistamines and a few instances of allergen immunotherapy. In the past 3 years hi daughter has acquired 3 cats, he states his symptoms are gradually getting worse. He has nasal congestion, sneezing, runny nose, and cough. He has been taking antihistamines with minimal relief. He is here for evaluation and possible treatment.

## 2024-10-04 ENCOUNTER — APPOINTMENT (OUTPATIENT)
Dept: PEDIATRIC ALLERGY IMMUNOLOGY | Facility: CLINIC | Age: 44
End: 2024-10-04
Payer: MEDICAID

## 2024-10-04 VITALS — BODY MASS INDEX: 25.71 KG/M2 | HEIGHT: 66 IN | WEIGHT: 160 LBS

## 2024-10-04 DIAGNOSIS — J30.1 ALLERGIC RHINITIS DUE TO POLLEN: ICD-10-CM

## 2024-10-04 DIAGNOSIS — J30.81 ALLERGIC RHINITIS DUE TO ANIMAL (CAT) (DOG) HAIR AND DANDER: ICD-10-CM

## 2024-10-04 PROCEDURE — 95024 IQ TESTS W/ALLERGENIC XTRCS: CPT

## 2024-10-04 PROCEDURE — 99213 OFFICE O/P EST LOW 20 MIN: CPT | Mod: 25

## 2024-10-04 RX ORDER — FEXOFENADINE HYDROCHLORIDE 180 MG/1
180 TABLET ORAL DAILY
Qty: 1 | Refills: 1 | Status: ACTIVE | COMMUNITY
Start: 2024-10-04 | End: 1900-01-01

## 2024-10-04 RX ORDER — LEVOCETIRIZINE DIHYDROCHLORIDE 5 MG/1
5 TABLET ORAL DAILY
Qty: 1 | Refills: 2 | Status: ACTIVE | COMMUNITY
Start: 2024-10-04 | End: 1900-01-01

## 2024-10-04 NOTE — HISTORY OF PRESENT ILLNESS
[None] : The patient is currently asymptomatic [de-identified] : RUPA SANTOS is a 44 year old male with a history of allergic rhinitis for many years. He has managed over the years with OTC antihistamines and a few instances of allergen immunotherapy. In the past 3 years hi daughter has acquired 3 cats, he states his symptoms are gradually getting worse. He has nasal congestion, sneezing, runny nose, and cough. He has been taking antihistamines with minimal relief. He is here for evaluation and possible treatment.

## 2024-10-11 ENCOUNTER — APPOINTMENT (OUTPATIENT)
Dept: PEDIATRIC ALLERGY IMMUNOLOGY | Facility: CLINIC | Age: 44
End: 2024-10-11
Payer: MEDICAID

## 2024-10-11 DIAGNOSIS — J30.1 ALLERGIC RHINITIS DUE TO POLLEN: ICD-10-CM

## 2024-10-11 PROCEDURE — 95117 IMMUNOTHERAPY INJECTIONS: CPT

## 2024-10-25 ENCOUNTER — APPOINTMENT (OUTPATIENT)
Dept: PEDIATRIC ALLERGY IMMUNOLOGY | Facility: CLINIC | Age: 44
End: 2024-10-25
Payer: MEDICAID

## 2024-10-25 DIAGNOSIS — J30.81 ALLERGIC RHINITIS DUE TO ANIMAL (CAT) (DOG) HAIR AND DANDER: ICD-10-CM

## 2024-10-25 PROCEDURE — 95117 IMMUNOTHERAPY INJECTIONS: CPT

## 2024-11-06 ENCOUNTER — APPOINTMENT (OUTPATIENT)
Facility: CLINIC | Age: 44
End: 2024-11-06

## 2024-12-24 ENCOUNTER — RX RENEWAL (OUTPATIENT)
Age: 44
End: 2024-12-24

## 2025-02-18 ENCOUNTER — RX RENEWAL (OUTPATIENT)
Age: 45
End: 2025-02-18

## 2025-02-19 ENCOUNTER — RX RENEWAL (OUTPATIENT)
Age: 45
End: 2025-02-19

## 2025-04-23 ENCOUNTER — APPOINTMENT (OUTPATIENT)
Dept: ORTHOPEDIC SURGERY | Facility: CLINIC | Age: 45
End: 2025-04-23
Payer: MEDICAID

## 2025-04-23 DIAGNOSIS — M67.432 GANGLION, LEFT WRIST: ICD-10-CM

## 2025-04-23 DIAGNOSIS — G56.01 CARPAL TUNNEL SYNDROME, RIGHT UPPER LIMB: ICD-10-CM

## 2025-04-23 PROCEDURE — 99214 OFFICE O/P EST MOD 30 MIN: CPT

## 2025-05-05 ENCOUNTER — OUTPATIENT (OUTPATIENT)
Dept: OUTPATIENT SERVICES | Facility: HOSPITAL | Age: 45
LOS: 1 days | End: 2025-05-05
Payer: MEDICAID

## 2025-05-05 ENCOUNTER — RESULT REVIEW (OUTPATIENT)
Age: 45
End: 2025-05-05

## 2025-05-05 DIAGNOSIS — Z98.890 OTHER SPECIFIED POSTPROCEDURAL STATES: Chronic | ICD-10-CM

## 2025-05-05 DIAGNOSIS — Z98.1 ARTHRODESIS STATUS: Chronic | ICD-10-CM

## 2025-05-05 DIAGNOSIS — Z87.81 PERSONAL HISTORY OF (HEALED) TRAUMATIC FRACTURE: Chronic | ICD-10-CM

## 2025-05-05 DIAGNOSIS — M67.432 GANGLION, LEFT WRIST: ICD-10-CM

## 2025-05-05 PROCEDURE — 73221 MRI JOINT UPR EXTREM W/O DYE: CPT | Mod: 26,LT

## 2025-05-05 PROCEDURE — 73221 MRI JOINT UPR EXTREM W/O DYE: CPT | Mod: LT

## 2025-05-06 DIAGNOSIS — M67.432 GANGLION, LEFT WRIST: ICD-10-CM

## 2025-05-12 ENCOUNTER — OUTPATIENT (OUTPATIENT)
Dept: OUTPATIENT SERVICES | Facility: HOSPITAL | Age: 45
LOS: 1 days | End: 2025-05-12
Payer: MEDICAID

## 2025-05-12 VITALS
DIASTOLIC BLOOD PRESSURE: 69 MMHG | SYSTOLIC BLOOD PRESSURE: 111 MMHG | TEMPERATURE: 98 F | HEART RATE: 98 BPM | WEIGHT: 169.98 LBS | HEIGHT: 64 IN | RESPIRATION RATE: 18 BRPM | OXYGEN SATURATION: 97 %

## 2025-05-12 DIAGNOSIS — Z98.890 OTHER SPECIFIED POSTPROCEDURAL STATES: Chronic | ICD-10-CM

## 2025-05-12 DIAGNOSIS — Z87.81 PERSONAL HISTORY OF (HEALED) TRAUMATIC FRACTURE: Chronic | ICD-10-CM

## 2025-05-12 DIAGNOSIS — Z98.1 ARTHRODESIS STATUS: Chronic | ICD-10-CM

## 2025-05-12 DIAGNOSIS — M67.432 GANGLION, LEFT WRIST: ICD-10-CM

## 2025-05-12 DIAGNOSIS — Z01.818 ENCOUNTER FOR OTHER PREPROCEDURAL EXAMINATION: ICD-10-CM

## 2025-05-12 PROCEDURE — 99214 OFFICE O/P EST MOD 30 MIN: CPT | Mod: 25

## 2025-05-12 NOTE — H&P PST ADULT - HISTORY OF PRESENT ILLNESS
pt with ganglion cyst- excised in past- but came back  now for planned procedure     PATIENT/GUARDIAN CURRENTLY DENIES CHEST PAIN  SHORTNESS OF BREATH  PALPITATIONS,  DYSURIA, OR UPPER RESPIRATORY INFECTION IN PAST 2 WEEKS  denies travel outside the [place] in the past 30 days    Anesthesia Alert  NO--Difficult Airway  NO--History of neck surgery or radiation  NO--Limited ROM of neck  NO--History of Malignant hyperthermia  NO--No personal or family history of Pseudocholinesterase deficiency.  NO--Prior Anesthesia Complication  NO--Latex Allergy  NO--Loose teeth  NO--History of Rheumatoid Arthritis  NO--Bleeding risk  NO--LOAN  NO--Other_____    PT/GUARDIAN DENIES ANY RASHES, ABRASION, OR OPEN WOUNDS OR CUTS    AS PER THE PT/GUARDIAN, THIS IS HIS/HER COMPLETE MEDICAL AND SURGICAL HX, INCLUDING MEDICATIONS PRESCRIBED AND OVER THE COUNTER    Patient/guardian understands the instructions and was given the opportunity to ask questions and have them answered.    pt/guardian denies any suicidal ideation or thoughts, pt states not a threat to self or others      Duke Activity Status Index (DASI)  58.2 points  The higher the score (maximum 58.2), the higher the functional status.  9.89 METs    Revised Cardiac Risk Index for Pre-Operative Risk  0 points  RCRI Score  3.9 %  Risk of major cardiac event pt with ganglion cyst- excised in past- but came back  now for planned procedure     PATIENT/GUARDIAN CURRENTLY DENIES CHEST PAIN  SHORTNESS OF BREATH  PALPITATIONS,  DYSURIA, OR UPPER RESPIRATORY INFECTION IN PAST 2 WEEKS  denies travel outside the [place] in the past 30 days    Anesthesia Alert  NO--Difficult Airway  +History of neck surgery  NO-- neck radiation  NO--Limited ROM of neck  NO--History of Malignant hyperthermia  NO--No personal or family history of Pseudocholinesterase deficiency.  NO--Prior Anesthesia Complication  NO--Latex Allergy  NO--Loose teeth  NO--History of Rheumatoid Arthritis  NO--Bleeding risk  NO--LOAN  NO--Other_____    PT/GUARDIAN DENIES ANY RASHES, ABRASION, OR OPEN WOUNDS OR CUTS    AS PER THE PT/GUARDIAN, THIS IS HIS/HER COMPLETE MEDICAL AND SURGICAL HX, INCLUDING MEDICATIONS PRESCRIBED AND OVER THE COUNTER    Patient/guardian understands the instructions and was given the opportunity to ask questions and have them answered.    pt/guardian denies any suicidal ideation or thoughts, pt states not a threat to self or others      Duke Activity Status Index (DASI)  58.2 points  The higher the score (maximum 58.2), the higher the functional status.  9.89 METs    Revised Cardiac Risk Index for Pre-Operative Risk  0 points  RCRI Score  3.9 %  Risk of major cardiac event

## 2025-05-12 NOTE — H&P PST ADULT - NSICDXPASTMEDICALHX_GEN_ALL_CORE_FT
PAST MEDICAL HISTORY:  2019 novel coronavirus disease (COVID-19) x2    H/O ganglion cyst      PAST MEDICAL HISTORY:  2019 novel coronavirus disease (COVID-19) x2    H/O ganglion cyst     Seasonal allergies

## 2025-05-12 NOTE — H&P PST ADULT - NS SC CAGE ALCOHOL ANNOYED YOU
Pt states that she has had thoughts of SI and thought about taking pills but came to ER   Pt states history of mental health     no

## 2025-05-12 NOTE — H&P PST ADULT - NSICDXPASTSURGICALHX_GEN_ALL_CORE_FT
PAST SURGICAL HISTORY:  H/O fracture of tibia 2010 right    H/O wrist surgery left    S/P cervical spinal fusion     S/P left rotator cuff repair

## 2025-05-12 NOTE — H&P PST ADULT - PRO ARRIVE FROM
Late entry: Pharmacist called requesting clarification of her Lantus directions. Called in Rx with correct sig, per office visit yesterday. home

## 2025-05-13 DIAGNOSIS — M67.432 GANGLION, LEFT WRIST: ICD-10-CM

## 2025-05-13 DIAGNOSIS — Z01.818 ENCOUNTER FOR OTHER PREPROCEDURAL EXAMINATION: ICD-10-CM

## 2025-05-14 NOTE — ASU PATIENT PROFILE, ADULT - NSICDXPASTMEDICALHX_GEN_ALL_CORE_FT
PAST MEDICAL HISTORY:  2019 novel coronavirus disease (COVID-19) x2    Diabetes     H/O ganglion cyst     Seasonal allergies

## 2025-05-14 NOTE — ASU PATIENT PROFILE, ADULT - FALL HARM RISK - UNIVERSAL INTERVENTIONS
Bed in lowest position, wheels locked, appropriate side rails in place/Call bell, personal items and telephone in reach/Instruct patient to call for assistance before getting out of bed or chair/Non-slip footwear when patient is out of bed/[place] to call system/Physically safe environment - no spills, clutter or unnecessary equipment/Purposeful Proactive Rounding/Room/bathroom lighting operational, light cord in reach

## 2025-05-15 ENCOUNTER — OUTPATIENT (OUTPATIENT)
Dept: OUTPATIENT SERVICES | Facility: HOSPITAL | Age: 45
LOS: 1 days | Discharge: HOME | End: 2025-05-15
Payer: MEDICAID

## 2025-05-15 ENCOUNTER — APPOINTMENT (OUTPATIENT)
Dept: ORTHOPEDIC SURGERY | Facility: AMBULATORY SURGERY CENTER | Age: 45
End: 2025-05-15

## 2025-05-15 ENCOUNTER — RESULT REVIEW (OUTPATIENT)
Age: 45
End: 2025-05-15

## 2025-05-15 ENCOUNTER — TRANSCRIPTION ENCOUNTER (OUTPATIENT)
Age: 45
End: 2025-05-15

## 2025-05-15 VITALS
HEART RATE: 80 BPM | DIASTOLIC BLOOD PRESSURE: 90 MMHG | SYSTOLIC BLOOD PRESSURE: 118 MMHG | OXYGEN SATURATION: 100 % | RESPIRATION RATE: 18 BRPM

## 2025-05-15 VITALS
WEIGHT: 169.98 LBS | HEART RATE: 78 BPM | RESPIRATION RATE: 16 BRPM | DIASTOLIC BLOOD PRESSURE: 75 MMHG | SYSTOLIC BLOOD PRESSURE: 104 MMHG | OXYGEN SATURATION: 100 % | HEIGHT: 64 IN | TEMPERATURE: 98 F

## 2025-05-15 DIAGNOSIS — Z98.1 ARTHRODESIS STATUS: Chronic | ICD-10-CM

## 2025-05-15 DIAGNOSIS — Z87.81 PERSONAL HISTORY OF (HEALED) TRAUMATIC FRACTURE: Chronic | ICD-10-CM

## 2025-05-15 DIAGNOSIS — M67.432 GANGLION, LEFT WRIST: ICD-10-CM

## 2025-05-15 DIAGNOSIS — Z98.890 OTHER SPECIFIED POSTPROCEDURAL STATES: Chronic | ICD-10-CM

## 2025-05-15 PROCEDURE — 88304 TISSUE EXAM BY PATHOLOGIST: CPT | Mod: 26

## 2025-05-15 PROCEDURE — 88304 TISSUE EXAM BY PATHOLOGIST: CPT

## 2025-05-15 PROCEDURE — 25112 REREMOVE WRIST TENDON LESION: CPT | Mod: LT

## 2025-05-15 RX ORDER — SODIUM CHLORIDE 9 G/1000ML
1000 INJECTION, SOLUTION INTRAVENOUS
Refills: 0 | Status: DISCONTINUED | OUTPATIENT
Start: 2025-05-15 | End: 2025-05-15

## 2025-05-15 RX ORDER — OXYCODONE HYDROCHLORIDE 30 MG/1
5 TABLET ORAL ONCE
Refills: 0 | Status: DISCONTINUED | OUTPATIENT
Start: 2025-05-15 | End: 2025-05-15

## 2025-05-15 RX ORDER — ACETAMINOPHEN 500 MG/5ML
1000 LIQUID (ML) ORAL ONCE
Refills: 0 | Status: DISCONTINUED | OUTPATIENT
Start: 2025-05-15 | End: 2025-05-15

## 2025-05-15 RX ORDER — ONDANSETRON HCL/PF 4 MG/2 ML
4 VIAL (ML) INJECTION ONCE
Refills: 0 | Status: DISCONTINUED | OUTPATIENT
Start: 2025-05-15 | End: 2025-05-15

## 2025-05-15 RX ORDER — HYDROMORPHONE/SOD CHLOR,ISO/PF 2 MG/10 ML
0.5 SYRINGE (ML) INJECTION
Refills: 0 | Status: DISCONTINUED | OUTPATIENT
Start: 2025-05-15 | End: 2025-05-15

## 2025-05-15 NOTE — ASU DISCHARGE PLAN (ADULT/PEDIATRIC) - FINANCIAL ASSISTANCE
[facility] provides services at a reduced cost to those who are determined to be eligible through [facility]’s financial assistance program. Information regarding [facility]’s financial assistance program can be found by going to [email or web address] or by calling [phone].

## 2025-05-15 NOTE — ASU PREOP CHECKLIST - INTERNAL PROSTHESES
right knee (screw); neck (plate & screw)/yes(specify) right knee (screw); neck (plate & screw), dexcom (left upper arm)/yes(specify)

## 2025-05-15 NOTE — PRE-ANESTHESIA EVALUATION ADULT - NSANTHOSAYNRD_GEN_A_CORE
No. LOAN screening performed.  STOP BANG Legend: 0-2 = LOW Risk; 3-4 = INTERMEDIATE Risk; 5-8 = HIGH Risk Satisfactory

## 2025-05-15 NOTE — BRIEF OPERATIVE NOTE - NSICDXBRIEFPROCEDURE_GEN_ALL_CORE_FT
PROCEDURES:  Surgical removal of recurrent volar ganglion cyst of wrist 15-May-2025 07:58:49  Karlee Gatica

## 2025-05-15 NOTE — BRIEF OPERATIVE NOTE - NSICDXBRIEFPREOP_GEN_ALL_CORE_FT
PRE-OP DIAGNOSIS:  Ganglion cyst of volar aspect of left wrist 15-May-2025 07:59:20  Karlee Gatica

## 2025-05-15 NOTE — ASU DISCHARGE PLAN (ADULT/PEDIATRIC) - NS MD DC FALL RISK RISK
For information on Fall & Injury Prevention, visit: [email or web address] OR  [email or web address] OR  https://www.cdc.gov/steadi/patient.html

## 2025-05-15 NOTE — PRE-ANESTHESIA EVALUATION ADULT - NSANTHPMHFT_GEN_ALL_CORE
on Ozempic for DM management. CGM reading 99.  METS>4 without CP/palpitations/sob  Denies orthopnea  ACDF C6-7

## 2025-05-15 NOTE — ASU DISCHARGE PLAN (ADULT/PEDIATRIC) - CARE PROVIDER_API CALL
Karlee Gatica [facility]  Orthopaedic Surgery  [address] [place] [address]  Phone: [phone]  Fax: [phone]  Follow Up Time:

## 2025-05-15 NOTE — CHART NOTE - NSCHARTNOTEFT_GEN_A_CORE
ANESTHESIA to PACU NOTE      ____ Intubated  TV:______       Rate: ______      FiO2: ______    __x__ Patent Airway    __x__ Full return of protective reflexes    ____ Full recovery from anesthesia / sedation to baseline status    Vitals:  HR 76  /88  RR 15  O2sat. 99%  Temp: 36.4C      Mental Status:  _x___ Awake   ___x__ Alert   _____ Drowsy   _____ Sedated    Nausea/Vomiting: ____ Yes, See Post - Op Orders      __x__ No    Pain Scale (0-10): _____    Treatment: __x__ None    ____ See Post - Op/PCA Orders    Post - Operative Fluids:   ____ Oral   __x__ See Post - Op Orders    Plan:  Discharge to:   _x___Home       _____Floor      _____Critical Care    _____ Other:_________________    Comments: s/p general anesthesia with LMA. No anesthesia complications. Pt's condition is stable in PACU. Full report is given to PACU RN.

## 2025-05-16 LAB — SURGICAL PATHOLOGY STUDY: SIGNIFICANT CHANGE UP

## 2025-05-20 DIAGNOSIS — M67.432 GANGLION, LEFT WRIST: ICD-10-CM

## 2025-05-20 DIAGNOSIS — E11.9 TYPE 2 DIABETES MELLITUS WITHOUT COMPLICATIONS: ICD-10-CM

## 2025-05-20 DIAGNOSIS — Z79.85 LONG-TERM (CURRENT) USE OF INJECTABLE NON-INSULIN ANTIDIABETIC DRUGS: ICD-10-CM

## 2025-05-27 ENCOUNTER — APPOINTMENT (OUTPATIENT)
Dept: ORTHOPEDIC SURGERY | Facility: CLINIC | Age: 45
End: 2025-05-27

## 2025-05-27 DIAGNOSIS — M67.432 GANGLION, LEFT WRIST: ICD-10-CM

## 2025-05-27 PROBLEM — Z87.39 PERSONAL HISTORY OF OTHER DISEASES OF THE MUSCULOSKELETAL SYSTEM AND CONNECTIVE TISSUE: Chronic | Status: ACTIVE | Noted: 2025-05-12

## 2025-05-27 PROBLEM — J30.2 OTHER SEASONAL ALLERGIC RHINITIS: Chronic | Status: ACTIVE | Noted: 2025-05-12

## 2025-05-27 PROBLEM — E11.9 TYPE 2 DIABETES MELLITUS WITHOUT COMPLICATIONS: Chronic | Status: ACTIVE | Noted: 2025-05-15

## 2025-05-27 PROCEDURE — 99024 POSTOP FOLLOW-UP VISIT: CPT

## 2025-06-20 ENCOUNTER — APPOINTMENT (OUTPATIENT)
Dept: ORTHOPEDIC SURGERY | Facility: CLINIC | Age: 45
End: 2025-06-20
Payer: MEDICAID

## 2025-06-20 PROCEDURE — 99024 POSTOP FOLLOW-UP VISIT: CPT

## 2025-07-19 ENCOUNTER — EMERGENCY (EMERGENCY)
Facility: HOSPITAL | Age: 45
LOS: 0 days | Discharge: LEFT BEFORE DISCHARGE | End: 2025-07-19
Attending: STUDENT IN AN ORGANIZED HEALTH CARE EDUCATION/TRAINING PROGRAM
Payer: MEDICAID

## 2025-07-19 VITALS
DIASTOLIC BLOOD PRESSURE: 77 MMHG | RESPIRATION RATE: 18 BRPM | HEART RATE: 89 BPM | HEIGHT: 65 IN | SYSTOLIC BLOOD PRESSURE: 128 MMHG | OXYGEN SATURATION: 97 % | TEMPERATURE: 98 F | WEIGHT: 164.91 LBS

## 2025-07-19 DIAGNOSIS — Z87.81 PERSONAL HISTORY OF (HEALED) TRAUMATIC FRACTURE: Chronic | ICD-10-CM

## 2025-07-19 DIAGNOSIS — I25.10 ATHEROSCLEROTIC HEART DISEASE OF NATIVE CORONARY ARTERY WITHOUT ANGINA PECTORIS: ICD-10-CM

## 2025-07-19 DIAGNOSIS — R07.89 OTHER CHEST PAIN: ICD-10-CM

## 2025-07-19 DIAGNOSIS — Z98.890 OTHER SPECIFIED POSTPROCEDURAL STATES: Chronic | ICD-10-CM

## 2025-07-19 DIAGNOSIS — R42 DIZZINESS AND GIDDINESS: ICD-10-CM

## 2025-07-19 DIAGNOSIS — Z98.1 ARTHRODESIS STATUS: Chronic | ICD-10-CM

## 2025-07-19 DIAGNOSIS — E11.9 TYPE 2 DIABETES MELLITUS WITHOUT COMPLICATIONS: ICD-10-CM

## 2025-07-19 DIAGNOSIS — Z82.49 FAMILY HISTORY OF ISCHEMIC HEART DISEASE AND OTHER DISEASES OF THE CIRCULATORY SYSTEM: ICD-10-CM

## 2025-07-19 DIAGNOSIS — Z79.84 LONG TERM (CURRENT) USE OF ORAL HYPOGLYCEMIC DRUGS: ICD-10-CM

## 2025-07-19 DIAGNOSIS — E78.5 HYPERLIPIDEMIA, UNSPECIFIED: ICD-10-CM

## 2025-07-19 DIAGNOSIS — T46.6X6A UNDERDOSING OF ANTIHYPERLIPIDEMIC AND ANTIARTERIOSCLEROTIC DRUGS, INITIAL ENCOUNTER: ICD-10-CM

## 2025-07-19 DIAGNOSIS — Z53.29 PROCEDURE AND TREATMENT NOT CARRIED OUT BECAUSE OF PATIENT'S DECISION FOR OTHER REASONS: ICD-10-CM

## 2025-07-19 DIAGNOSIS — R20.2 PARESTHESIA OF SKIN: ICD-10-CM

## 2025-07-19 LAB
ALBUMIN SERPL ELPH-MCNC: 4.2 G/DL — SIGNIFICANT CHANGE UP (ref 3.5–5.2)
ALP SERPL-CCNC: 75 U/L — SIGNIFICANT CHANGE UP (ref 30–115)
ALT FLD-CCNC: 35 U/L — SIGNIFICANT CHANGE UP (ref 0–41)
ANION GAP SERPL CALC-SCNC: 11 MMOL/L — SIGNIFICANT CHANGE UP (ref 7–14)
AST SERPL-CCNC: 30 U/L — SIGNIFICANT CHANGE UP (ref 0–41)
BASOPHILS # BLD AUTO: 0.03 K/UL — SIGNIFICANT CHANGE UP (ref 0–0.2)
BASOPHILS NFR BLD AUTO: 0.5 % — SIGNIFICANT CHANGE UP (ref 0–1)
BILIRUB SERPL-MCNC: <0.2 MG/DL — SIGNIFICANT CHANGE UP (ref 0.2–1.2)
BUN SERPL-MCNC: 22 MG/DL — HIGH (ref 10–20)
CALCIUM SERPL-MCNC: 8.9 MG/DL — SIGNIFICANT CHANGE UP (ref 8.4–10.5)
CHLORIDE SERPL-SCNC: 103 MMOL/L — SIGNIFICANT CHANGE UP (ref 98–110)
CO2 SERPL-SCNC: 24 MMOL/L — SIGNIFICANT CHANGE UP (ref 17–32)
CREAT SERPL-MCNC: 1.2 MG/DL — SIGNIFICANT CHANGE UP (ref 0.7–1.5)
EGFR: 76 ML/MIN/1.73M2 — SIGNIFICANT CHANGE UP
EGFR: 76 ML/MIN/1.73M2 — SIGNIFICANT CHANGE UP
EOSINOPHIL # BLD AUTO: 0.07 K/UL — SIGNIFICANT CHANGE UP (ref 0–0.7)
EOSINOPHIL NFR BLD AUTO: 1.1 % — SIGNIFICANT CHANGE UP (ref 0–8)
GLUCOSE SERPL-MCNC: 108 MG/DL — HIGH (ref 70–99)
HCT VFR BLD CALC: 45 % — SIGNIFICANT CHANGE UP (ref 42–52)
HGB BLD-MCNC: 14.9 G/DL — SIGNIFICANT CHANGE UP (ref 14–18)
IMM GRANULOCYTES NFR BLD AUTO: 0.2 % — SIGNIFICANT CHANGE UP (ref 0.1–0.3)
LYMPHOCYTES # BLD AUTO: 0.93 K/UL — LOW (ref 1.2–3.4)
LYMPHOCYTES # BLD AUTO: 14.9 % — LOW (ref 20.5–51.1)
MCHC RBC-ENTMCNC: 29.8 PG — SIGNIFICANT CHANGE UP (ref 27–31)
MCHC RBC-ENTMCNC: 33.1 G/DL — SIGNIFICANT CHANGE UP (ref 32–37)
MCV RBC AUTO: 90 FL — SIGNIFICANT CHANGE UP (ref 80–94)
MONOCYTES # BLD AUTO: 0.47 K/UL — SIGNIFICANT CHANGE UP (ref 0.1–0.6)
MONOCYTES NFR BLD AUTO: 7.5 % — SIGNIFICANT CHANGE UP (ref 1.7–9.3)
NEUTROPHILS # BLD AUTO: 4.74 K/UL — SIGNIFICANT CHANGE UP (ref 1.4–6.5)
NEUTROPHILS NFR BLD AUTO: 75.8 % — HIGH (ref 42.2–75.2)
NRBC BLD AUTO-RTO: 0 /100 WBCS — SIGNIFICANT CHANGE UP (ref 0–0)
PLATELET # BLD AUTO: 258 K/UL — SIGNIFICANT CHANGE UP (ref 130–400)
PMV BLD: 10.4 FL — SIGNIFICANT CHANGE UP (ref 7.4–10.4)
POTASSIUM SERPL-MCNC: 4.8 MMOL/L — SIGNIFICANT CHANGE UP (ref 3.5–5)
POTASSIUM SERPL-SCNC: 4.8 MMOL/L — SIGNIFICANT CHANGE UP (ref 3.5–5)
PROT SERPL-MCNC: 6.7 G/DL — SIGNIFICANT CHANGE UP (ref 6–8)
RBC # BLD: 5 M/UL — SIGNIFICANT CHANGE UP (ref 4.7–6.1)
RBC # FLD: 13.2 % — SIGNIFICANT CHANGE UP (ref 11.5–14.5)
SODIUM SERPL-SCNC: 138 MMOL/L — SIGNIFICANT CHANGE UP (ref 135–146)
TROPONIN T, HIGH SENSITIVITY RESULT: <6 NG/L — SIGNIFICANT CHANGE UP (ref 6–21)
WBC # BLD: 6.25 K/UL — SIGNIFICANT CHANGE UP (ref 4.8–10.8)
WBC # FLD AUTO: 6.25 K/UL — SIGNIFICANT CHANGE UP (ref 4.8–10.8)

## 2025-07-19 PROCEDURE — 80053 COMPREHEN METABOLIC PANEL: CPT

## 2025-07-19 PROCEDURE — 36000 PLACE NEEDLE IN VEIN: CPT

## 2025-07-19 PROCEDURE — 84484 ASSAY OF TROPONIN QUANT: CPT

## 2025-07-19 PROCEDURE — 85025 COMPLETE CBC W/AUTO DIFF WBC: CPT

## 2025-07-19 PROCEDURE — 99285 EMERGENCY DEPT VISIT HI MDM: CPT | Mod: 25

## 2025-07-19 PROCEDURE — 71045 X-RAY EXAM CHEST 1 VIEW: CPT | Mod: 26

## 2025-07-19 PROCEDURE — 93005 ELECTROCARDIOGRAM TRACING: CPT

## 2025-07-19 PROCEDURE — 36415 COLL VENOUS BLD VENIPUNCTURE: CPT

## 2025-07-19 PROCEDURE — 99285 EMERGENCY DEPT VISIT HI MDM: CPT

## 2025-07-19 PROCEDURE — 93010 ELECTROCARDIOGRAM REPORT: CPT

## 2025-07-19 PROCEDURE — 71045 X-RAY EXAM CHEST 1 VIEW: CPT

## 2025-07-19 RX ORDER — ASPIRIN 325 MG
162 TABLET ORAL ONCE
Refills: 0 | Status: COMPLETED | OUTPATIENT
Start: 2025-07-19 | End: 2025-07-19

## 2025-07-19 RX ADMIN — Medication 162 MILLIGRAM(S): at 13:49

## 2025-08-05 ENCOUNTER — APPOINTMENT (OUTPATIENT)
Dept: ORTHOPEDIC SURGERY | Facility: CLINIC | Age: 45
End: 2025-08-05